# Patient Record
Sex: FEMALE | Race: WHITE | NOT HISPANIC OR LATINO | Employment: UNEMPLOYED | URBAN - NONMETROPOLITAN AREA
[De-identification: names, ages, dates, MRNs, and addresses within clinical notes are randomized per-mention and may not be internally consistent; named-entity substitution may affect disease eponyms.]

---

## 2017-05-18 ENCOUNTER — TELEPHONE (OUTPATIENT)
Dept: CARDIOLOGY | Facility: CLINIC | Age: 57
End: 2017-05-18

## 2017-05-18 ENCOUNTER — OFFICE VISIT (OUTPATIENT)
Dept: CARDIOLOGY | Facility: CLINIC | Age: 57
End: 2017-05-18

## 2017-05-18 VITALS
HEART RATE: 64 BPM | WEIGHT: 165 LBS | SYSTOLIC BLOOD PRESSURE: 90 MMHG | DIASTOLIC BLOOD PRESSURE: 64 MMHG | HEIGHT: 66 IN | BODY MASS INDEX: 26.52 KG/M2

## 2017-05-18 DIAGNOSIS — I10 ESSENTIAL HYPERTENSION: ICD-10-CM

## 2017-05-18 DIAGNOSIS — I25.118 CORONARY ARTERY DISEASE OF NATIVE ARTERY OF NATIVE HEART WITH STABLE ANGINA PECTORIS (HCC): Primary | ICD-10-CM

## 2017-05-18 DIAGNOSIS — E03.8 OTHER SPECIFIED HYPOTHYROIDISM: ICD-10-CM

## 2017-05-18 DIAGNOSIS — E78.49 OTHER HYPERLIPIDEMIA: ICD-10-CM

## 2017-05-18 DIAGNOSIS — K21.9 GASTROESOPHAGEAL REFLUX DISEASE WITHOUT ESOPHAGITIS: ICD-10-CM

## 2017-05-18 PROBLEM — I25.10 CAD (CORONARY ARTERY DISEASE): Status: ACTIVE | Noted: 2017-05-18

## 2017-05-18 PROBLEM — E78.5 HYPERLIPEMIA: Status: ACTIVE | Noted: 2017-05-18

## 2017-05-18 PROBLEM — E03.9 HYPOTHYROIDISM: Status: ACTIVE | Noted: 2017-05-18

## 2017-05-18 PROCEDURE — 99214 OFFICE O/P EST MOD 30 MIN: CPT | Performed by: NURSE PRACTITIONER

## 2017-05-18 RX ORDER — CYCLOBENZAPRINE HCL 10 MG
10 TABLET ORAL 3 TIMES DAILY PRN
COMMUNITY
End: 2018-01-31

## 2017-05-18 RX ORDER — CITALOPRAM 20 MG/1
20 TABLET ORAL DAILY
COMMUNITY
End: 2017-08-22

## 2017-05-18 RX ORDER — TRAZODONE HYDROCHLORIDE 100 MG/1
100 TABLET ORAL NIGHTLY
COMMUNITY
End: 2018-01-31

## 2017-07-25 ENCOUNTER — OUTSIDE FACILITY SERVICE (OUTPATIENT)
Dept: CARDIOLOGY | Facility: CLINIC | Age: 57
End: 2017-07-25

## 2017-07-25 PROCEDURE — 93459 L HRT ART/GRFT ANGIO: CPT | Performed by: INTERNAL MEDICINE

## 2017-07-26 ENCOUNTER — OUTSIDE FACILITY SERVICE (OUTPATIENT)
Dept: CARDIOLOGY | Facility: CLINIC | Age: 57
End: 2017-07-26

## 2017-07-26 PROCEDURE — 99217 PR OBSERVATION CARE DISCHARGE MANAGEMENT: CPT | Performed by: INTERNAL MEDICINE

## 2017-07-31 ENCOUNTER — TELEPHONE (OUTPATIENT)
Dept: CARDIOLOGY | Facility: CLINIC | Age: 57
End: 2017-07-31

## 2017-07-31 NOTE — TELEPHONE ENCOUNTER
Patient received 30 day free card and will continue taking. She was advised to call before running out and we would give her samples.

## 2017-07-31 NOTE — TELEPHONE ENCOUNTER
I am not sure when she was on this.  She was not on it at my last visit.  Maybe added at cath. If we have samples, I would like her to be on brilinta for at least one to two months.

## 2017-07-31 NOTE — TELEPHONE ENCOUNTER
PA for Brilinta was denied. Patient needs to try and fail  one of the following:  Cilostazol, Dipyridamole, Clopidogrel    Which do you suggest? Dose/directions please

## 2017-07-31 NOTE — TELEPHONE ENCOUNTER
Patient received a 30 day free card for the medicaiton. She will continue to take it and will call before running out for samples.

## 2017-08-18 ENCOUNTER — TELEPHONE (OUTPATIENT)
Dept: CARDIOLOGY | Facility: CLINIC | Age: 57
End: 2017-08-18

## 2017-08-18 RX ORDER — METOPROLOL SUCCINATE 25 MG/1
TABLET, EXTENDED RELEASE ORAL
Qty: 90 TABLET | Refills: 0 | Status: SHIPPED | OUTPATIENT
Start: 2017-08-18 | End: 2017-08-22

## 2017-08-18 NOTE — TELEPHONE ENCOUNTER
Patient called requesting refills on Metoprolol Succinate XL 1/2 to one tablet once daily. Refill on Metoprolol Succinate XL 25mg 1/2 to one tablet once daily sent to pharmacy.

## 2017-08-22 ENCOUNTER — OFFICE VISIT (OUTPATIENT)
Dept: CARDIOLOGY | Facility: CLINIC | Age: 57
End: 2017-08-22

## 2017-08-22 VITALS
HEART RATE: 76 BPM | WEIGHT: 159 LBS | SYSTOLIC BLOOD PRESSURE: 94 MMHG | DIASTOLIC BLOOD PRESSURE: 62 MMHG | BODY MASS INDEX: 25.55 KG/M2 | HEIGHT: 66 IN

## 2017-08-22 DIAGNOSIS — E03.9 ACQUIRED HYPOTHYROIDISM: ICD-10-CM

## 2017-08-22 DIAGNOSIS — E78.49 OTHER HYPERLIPIDEMIA: ICD-10-CM

## 2017-08-22 DIAGNOSIS — I10 ESSENTIAL HYPERTENSION: ICD-10-CM

## 2017-08-22 DIAGNOSIS — Z95.1 HX OF CABG: ICD-10-CM

## 2017-08-22 DIAGNOSIS — I25.10 CORONARY ARTERY DISEASE INVOLVING NATIVE CORONARY ARTERY OF NATIVE HEART WITHOUT ANGINA PECTORIS: Primary | ICD-10-CM

## 2017-08-22 DIAGNOSIS — R06.02 SHORTNESS OF BREATH: ICD-10-CM

## 2017-08-22 PROCEDURE — 99214 OFFICE O/P EST MOD 30 MIN: CPT | Performed by: NURSE PRACTITIONER

## 2017-08-22 RX ORDER — LEVOTHYROXINE SODIUM 0.07 MG/1
75 TABLET ORAL DAILY
COMMUNITY
End: 2018-07-31 | Stop reason: DRUGHIGH

## 2017-08-22 RX ORDER — ENALAPRIL MALEATE 5 MG/1
5 TABLET ORAL DAILY
COMMUNITY
End: 2017-08-22

## 2017-08-22 RX ORDER — ENALAPRIL MALEATE 5 MG/1
TABLET ORAL
Qty: 30 TABLET | Refills: 0 | OUTPATIENT
Start: 2017-08-22

## 2017-08-22 RX ORDER — ENALAPRIL MALEATE 2.5 MG/1
2.5 TABLET ORAL DAILY
Qty: 30 TABLET | Refills: 6 | Status: SHIPPED | OUTPATIENT
Start: 2017-08-22 | End: 2017-08-28 | Stop reason: ALTCHOICE

## 2017-08-22 RX ORDER — ASPIRIN 81 MG/1
81 TABLET ORAL DAILY
COMMUNITY
End: 2018-01-08 | Stop reason: SDUPTHER

## 2017-08-22 RX ORDER — CLOPIDOGREL BISULFATE 75 MG/1
75 TABLET ORAL DAILY
Qty: 30 TABLET | Refills: 11 | Status: SHIPPED | OUTPATIENT
Start: 2017-08-22 | End: 2018-07-31 | Stop reason: SDUPTHER

## 2017-08-22 NOTE — PROGRESS NOTES
Chief Complaint   Patient presents with   • Follow-up     Had cath with stenting in July. Denies chest pain. Is concerned about her brilinta being covered on her insurance. Needs refills on enalapril for 30 days to [facility] pharmacy.    • Shortness of Breath     Says she has some shortness of breath after she takes her brilinta.    • Palpitations     Occasional. Says is sometimes r/t the shortness of breath she has with the brilinta.        Subjective       Amina Nath is a 57 y.o. female  with a history of hypertension, hypothyroidism, major depressive disorder, and ischemic heart disease. In 2009 she was noted to have severe three-vessel disease and also possible endocarditis involving the tricuspid valve. She underwent bypass surgery, tricuspid valve repair as well as PFO closure at that time. In 2014 she was seen in the office to establish cardiac care. She later had episodes of syncope and metoprolol was discontinued. In 2016,  she reported chest pain and shortness of breath for which a stress test and echocardiogram was done. No definite ischemia was noted but she did have tachycardia. The dose of her beta blocker was increased. She continued to complain of chest pain and shortness of breath, repeat stress showed no ischemic burden and good LV function.  Echo showed only mild MR. Due to persistent symptoms a cath was done and significant disease of her SVG to RCA and GUEVARA to Ramus was noted and underwent stenting.   Today she comes to the office for a hospital follow up visit and reports improvement of chest pain. She has some shortness of breath when takes Brilinta. Otherwise, she states she is feeling better.     HPI         Cardiac History:    Past Surgical History:   Procedure Laterality Date   • CARDIAC CATHETERIZATION  09/10/2009    Dr. HOLLIDAY 50% LM, 90% LAD, 80% LCX, 70% Ramus, 80% RCA.   • CARDIAC CATHETERIZATION  07/25/2017    100% SVG to RCA. 2.25x8 Promus to RCA. 95% GUEVARA to Ramus- 2.25x12 Promus   •  CARDIOVASCULAR STRESS TEST  06/28/2012    5 min 88% THR. Negative   • CARDIOVASCULAR STRESS TEST  02/03/2014    L. Myoview- negative for ischemia   • CARDIOVASCULAR STRESS TEST  03/24/2016    Mod Elvis-9 min, 84% THR, 142/98, no definite ischemia, increase Metoprolol   • CARDIOVASCULAR STRESS TEST  10/25/2016    EF 65%, no ischemia   • CONVERTED (HISTORICAL) HOLTER  08/11/2015    baseline sinus 66, no arrhythmias, rare PVC, PAC   • CORONARY ARTERY BYPASS GRAFT  09/10/2009    CROOK-LAD, GUEVARA-Ramus, seq SVG-Om1, OM2. SVG to OM3. SVG to PDA. PFO closure. TV repair   • ECHO - CONVERTED  09/10/2009    ([facility]) Dr. Lucero EF 55%. Anteroseptal WMA   • ECHO - CONVERTED  02/03/2014    EF 55-60%, mild MR, no pericardial effusion   • ECHO - CONVERTED  03/24/2016    EF 55-60%, mild MR, RVSP 19 mmHg, tachycardia   • ECHO - CONVERTED  10/25/2016    EF 55-60%, mild MR   • OTHER SURGICAL HISTORY  07/14/2015    MRI brain- no evidence of CVA or hemorrhage   • US CAROTID UNILATERAL  07/22/2015    no sig stenosis       Current Outpatient Prescriptions   Medication Sig Dispense Refill   • aspirin 81 MG EC tablet Take 81 mg by mouth Daily. Increase to twice a day when start Plavix     • atorvastatin (LIPITOR) 20 MG tablet Take 20 mg by mouth Daily.     • Citalopram Hydrobromide (CELEXA PO) Take 30 mg by mouth Daily.     • clonazePAM (KlonoPIN) 0.5 MG tablet Take 0.5 mg by mouth 2 (Two) Times a Day As Needed for seizures.     • cyclobenzaprine (FLEXERIL) 10 MG tablet Take 10 mg by mouth 3 (Three) Times a Day As Needed for Muscle Spasms.     • levothyroxine (SYNTHROID, LEVOTHROID) 75 MCG tablet Take 75 mcg by mouth Daily.     • metoclopramide (REGLAN) 10 MG tablet Take 10 mg by mouth 2 (Two) Times a Day.     • metoprolol tartrate (LOPRESSOR) 25 MG tablet Take 25 mg by mouth 2 (Two) Times a Day.     • nitroglycerin (NITROSTAT) 0.4 MG SL tablet Place 0.4 mg under the tongue Every 5 (Five) Minutes As Needed for chest pain. Take no more than 3  doses in 15 minutes.     • omeprazole (PriLOSEC) 20 MG capsule TAKE ONE CAPSULE BY MOUTH ONCE DAILY 90 capsule 3   • oxybutynin XL (DITROPAN-XL) 10 MG 24 hr tablet Take 10 mg by mouth Daily.     • traZODone (DESYREL) 100 MG tablet Take 100 mg by mouth Every Night.     • clopidogrel (PLAVIX) 75 MG tablet Take 1 tablet by mouth Daily. 30 tablet 11   • enalapril (VASOTEC) 2.5 MG tablet Take 1 tablet by mouth Daily. 30 tablet 6     No current facility-administered medications for this visit.        Review of patient's allergies indicates no known allergies.    Past Medical History:   Diagnosis Date   • Anxiety    • CAD (coronary artery disease)     s/p cabg   • Depression    • GERD (gastroesophageal reflux disease)    • H/O breast biopsy     benign   • Hypercholesterolemia    • Hyperlipidemia    • Hypertension    • Hypothyroidism    • Insomnia        Social History     Social History   • Marital status: Single     Spouse name: N/A   • Number of children: N/A   • Years of education: N/A     Occupational History   • Not on file.     Social History Main Topics   • Smoking status: Former Smoker     Quit date: 2009   • Smokeless tobacco: Never Used      Comment: 1-2 ppd for 35 years   • Alcohol use No   • Drug use: No   • Sexual activity: Not on file     Other Topics Concern   • Not on file     Social History Narrative       Family History   Problem Relation Age of Onset   • Heart attack Father        Review of Systems   Constitution: Positive for malaise/fatigue and weight loss (watching diet better. ). Negative for decreased appetite.   HENT: Negative for congestion, nosebleeds and sore throat.    Eyes: Negative for blurred vision.   Cardiovascular: Negative for chest pain, dyspnea on exertion, near-syncope and palpitations.   Respiratory: Positive for shortness of breath (mild attributes to Brilinta). Negative for snoring.    Endocrine: Negative for cold intolerance and heat intolerance.   Hematologic/Lymphatic: Negative  "for adenopathy and bleeding problem. Bruises/bleeds easily.   Skin: Negative for itching, nail changes and skin cancer.   Musculoskeletal: Negative for arthritis and myalgias.   Gastrointestinal: Negative for abdominal pain, dysphagia, heartburn and melena.   Genitourinary: Negative for dysuria and hematuria.   Neurological: Negative for dizziness, light-headedness, seizures and vertigo.   Psychiatric/Behavioral: Negative for altered mental status. The patient is nervous/anxious. The patient does not have insomnia.    Allergic/Immunologic: Negative for environmental allergies and hives.        Diabetes- No  Thyroid-abnormal    Objective     BP 94/62  Pulse 76  Ht 65.5\" (166.4 cm)  Wt 159 lb (72.1 kg)  BMI 26.06 kg/m2    Physical Exam   Constitutional: She is oriented to person, place, and time. She appears well-nourished.   HENT:   Head: Normocephalic.   Eyes: Pupils are equal, round, and reactive to light.   Neck: Normal range of motion. Neck supple. No JVD present. Carotid bruit is not present. No thyromegaly present.   Cardiovascular: Normal rate, regular rhythm, S1 normal and S2 normal.    No murmur heard.  Pulses:       Radial pulses are 3+ on the right side, and 3+ on the left side.   Pulmonary/Chest: Breath sounds normal.   Abdominal: Soft. Bowel sounds are normal. There is no tenderness.   Musculoskeletal: Normal range of motion. She exhibits no edema.   Neurological: She is alert and oriented to person, place, and time.   Skin: Skin is warm and dry. No rash noted. No pallor.   Psychiatric: She has a normal mood and affect. Her behavior is normal.      Procedures          Assessment/Plan      Amina was seen today for follow-up, shortness of breath and palpitations.    Diagnoses and all orders for this visit:    Coronary artery disease involving native coronary artery of native heart without angina pectoris    Essential hypertension    Other hyperlipidemia    Acquired hypothyroidism    Shortness of " "breath    Hx of CABG    Other orders  -     clopidogrel (PLAVIX) 75 MG tablet; Take 1 tablet by mouth Daily.  -     enalapril (VASOTEC) 2.5 MG tablet; Take 1 tablet by mouth Daily.      The report of her recent cardiac cath with stenting of vein graft and GUEVARA was reviewed. Her symptoms have improved. Blood pressure is slight low which she states makes her feel \" a little tired\". We will decrease the dose of Vasotec. Her Insurance does not cover Brilinta therefore samples given to take for next couple weeks. Then she will change to Plavix and increase aspirin to 81 mg bid. No other medication changes made at this time. Her weight is down about 6 pounds and BMI 26. I encouraged her on maintaining healthier diet and goal weight 155. For management of labs she will follow with you.            Electronically signed by PRACHI Brink,  August 22, 2017 3:32 PM  "

## 2017-08-24 ENCOUNTER — TELEPHONE (OUTPATIENT)
Dept: CARDIOLOGY | Facility: CLINIC | Age: 57
End: 2017-08-24

## 2017-08-24 NOTE — TELEPHONE ENCOUNTER
Shayy CLAYTON      Patient called in reporting that when she saw you the other day forgot to tell you that she has not been taking enalapril and that she was only taking Metoprolol 25mg 1/2 tablet daily due to low b/p.

## 2017-09-18 ENCOUNTER — TELEPHONE (OUTPATIENT)
Dept: CARDIOLOGY | Facility: CLINIC | Age: 57
End: 2017-09-18

## 2017-09-18 NOTE — TELEPHONE ENCOUNTER
Received call from patient wanting to know if okay to take Amoxicillin 875mg with Brilinta. Patient instructed that it is okay to take Amoxicillin with Brilinta.

## 2017-11-17 ENCOUNTER — TELEPHONE (OUTPATIENT)
Dept: CARDIOLOGY | Facility: CLINIC | Age: 57
End: 2017-11-17

## 2017-11-17 RX ORDER — METOPROLOL SUCCINATE 25 MG/1
TABLET, EXTENDED RELEASE ORAL
Qty: 90 TABLET | Refills: 0 | OUTPATIENT
Start: 2017-11-17

## 2017-11-17 RX ORDER — METOPROLOL SUCCINATE 25 MG/1
TABLET, EXTENDED RELEASE ORAL
Qty: 15 TABLET | Refills: 3 | Status: SHIPPED | OUTPATIENT
Start: 2017-11-17 | End: 2018-01-31 | Stop reason: SDUPTHER

## 2017-11-17 NOTE — TELEPHONE ENCOUNTER
Patient made aware of recommendations for Toprol xl 25mg 1/2 tablet daily, patient verbalized understanding. Script for Toprol xl 25mg 1/2 tablet daily sent to pharmacy.

## 2017-11-17 NOTE — TELEPHONE ENCOUNTER
Patient called stating has been taking Toprol xl 25 mg 1/2 tablet daily instead of to Lopressor 25mg 1/2 tablet because Lopressor is to small for her to half, states doing well with Toprol xl 25mg 1/2 tablet daily and requesting Toprol xl 25mg 1/2 tablet daily. What is your recommendations? Thanks

## 2018-01-08 RX ORDER — ASPIRIN 81 MG/1
TABLET ORAL
Qty: 60 TABLET | Refills: 8 | Status: SHIPPED | OUTPATIENT
Start: 2018-01-08 | End: 2018-07-31 | Stop reason: SDUPTHER

## 2018-01-31 ENCOUNTER — OFFICE VISIT (OUTPATIENT)
Dept: CARDIOLOGY | Facility: CLINIC | Age: 58
End: 2018-01-31

## 2018-01-31 VITALS
HEART RATE: 88 BPM | HEIGHT: 66 IN | DIASTOLIC BLOOD PRESSURE: 72 MMHG | SYSTOLIC BLOOD PRESSURE: 100 MMHG | OXYGEN SATURATION: 93 % | BODY MASS INDEX: 27.24 KG/M2 | WEIGHT: 169.5 LBS

## 2018-01-31 DIAGNOSIS — I10 ESSENTIAL HYPERTENSION: ICD-10-CM

## 2018-01-31 DIAGNOSIS — Z98.890 S/P TVR (TRICUSPID VALVE REPAIR): ICD-10-CM

## 2018-01-31 DIAGNOSIS — Z95.1 HX OF CABG: ICD-10-CM

## 2018-01-31 DIAGNOSIS — R55 SYNCOPE AND COLLAPSE: ICD-10-CM

## 2018-01-31 DIAGNOSIS — E78.49 OTHER HYPERLIPIDEMIA: ICD-10-CM

## 2018-01-31 DIAGNOSIS — I34.0 NON-RHEUMATIC MITRAL REGURGITATION: ICD-10-CM

## 2018-01-31 DIAGNOSIS — I25.118 CORONARY ARTERY DISEASE OF NATIVE ARTERY OF NATIVE HEART WITH STABLE ANGINA PECTORIS (HCC): Primary | ICD-10-CM

## 2018-01-31 DIAGNOSIS — R00.2 PALPITATIONS: ICD-10-CM

## 2018-01-31 PROCEDURE — 99214 OFFICE O/P EST MOD 30 MIN: CPT | Performed by: NURSE PRACTITIONER

## 2018-01-31 RX ORDER — NITROGLYCERIN 0.4 MG/1
0.4 TABLET SUBLINGUAL
Qty: 25 TABLET | Refills: 1 | Status: SHIPPED | OUTPATIENT
Start: 2018-01-31 | End: 2019-02-04 | Stop reason: SDUPTHER

## 2018-01-31 NOTE — PROGRESS NOTES
"Chief Complaint   Patient presents with   • Follow-up     Cardiac management. Denies chest pain, shortness of breath or palpitaitions. Said about a week and a half ago she felt dizzy and had to use the bathroom. Upon getting up from commode she got dizzy, passed out and hit head on floor. Says she has not had anymore episode since.   • Chest Pain     Reports at times has a pain in the left side of chest down outer part of breast, doesnt last long.   • Med Refill     Needs refillls on Nitroglycerin tabs sent to [facility] in [place]       Subjective       Amina Nath is a 57 y.o. female  with a history of hypertension, hypothyroidism, major depressive disorder, and ischemic heart disease. In 2009 she was noted to have severe three-vessel disease and also possible endocarditis involving the tricuspid valve. She underwent bypass surgery, tricuspid valve repair as well as PFO closure at that time. In 2014 she was seen in the office to establish cardiac care. She later had episodes of syncope and metoprolol was discontinued. In 2016,  she reported chest pain and shortness of breath for which a stress test and echocardiogram were done. No definite ischemia was noted but she did have tachycardia. The dose of her beta blocker was increased. She continued to complain of chest pain and shortness of breath, repeat stress showed no ischemic burden and good LV function.  Echo showed only mild MR. Due to persistent symptoms a cath was done and significant disease of her SVG to RCA and GUEVARA to Ramus was noted and underwent stenting.   Today she returns to the office for a follow up visit. She voices concern over recent episode of dizziness. She felt dizziness and then went to restroom. When standing up she \"passed out\". Since that time she has had some mild episodes of brief dizziness but no more syncope. Over the last couple of months she has noticed development of mild left sided chest pain. These symptoms concerns her given " they are very similar to symptoms she experienced prior to last cath when she underwent stenting of SVG and GUEVARA.     HPI: as noted above         Cardiac History:    Past Surgical History:   Procedure Laterality Date   • CARDIAC CATHETERIZATION  09/10/2009    Dr. HOLLIDAY 50% LM, 90% LAD, 80% LCX, 70% Ramus, 80% RCA.   • CARDIAC CATHETERIZATION  07/25/2017    100% SVG to RCA. 2.25x8 Promus to RCA. 95% GUEVARA to Ramus- 2.25x12 Promus   • CARDIOVASCULAR STRESS TEST  06/28/2012    5 min 88% THR. Negative   • CARDIOVASCULAR STRESS TEST  02/03/2014    L. Myoview- negative for ischemia   • CARDIOVASCULAR STRESS TEST  03/24/2016    Mod Elvis-9 min, 84% THR, 142/98, no definite ischemia, increase Metoprolol   • CARDIOVASCULAR STRESS TEST  10/25/2016    EF 65%, no ischemia   • CONVERTED (HISTORICAL) HOLTER  08/11/2015    baseline sinus 66, no arrhythmias, rare PVC, PAC   • CORONARY ARTERY BYPASS GRAFT  09/10/2009    CROOK-LAD, GUEVARA-Ramus, seq SVG-Om1, OM2. SVG to OM3. SVG to PDA. PFO closure. TV repair   • ECHO - CONVERTED  09/10/2009    ([facility]) Dr. Lucero EF 55%. Anteroseptal WMA   • ECHO - CONVERTED  02/03/2014    EF 55-60%, mild MR, no pericardial effusion   • ECHO - CONVERTED  03/24/2016    EF 55-60%, mild MR, RVSP 19 mmHg, tachycardia   • ECHO - CONVERTED  10/25/2016    EF 55-60%, mild MR   • OTHER SURGICAL HISTORY  07/14/2015    MRI brain- no evidence of CVA or hemorrhage   • US CAROTID UNILATERAL  07/22/2015    no sig stenosis       Current Outpatient Prescriptions   Medication Sig Dispense Refill   • aspirin 81 MG EC tablet 1 tab twice a day 60 tablet 8   • clopidogrel (PLAVIX) 75 MG tablet Take 1 tablet by mouth Daily. 30 tablet 11   • levothyroxine (SYNTHROID, LEVOTHROID) 75 MCG tablet Take 75 mcg by mouth Daily.     • metoclopramide (REGLAN) 10 MG tablet Take 10 mg by mouth 2 (Two) Times a Day.     • metoprolol tartrate (LOPRESSOR) 25 MG tablet Take 1/2 tablet by mouth daily 15 tablet 5   • nitroglycerin (NITROSTAT) 0.4 MG SL  tablet Place 1 tablet under the tongue Every 5 (Five) Minutes As Needed for Chest Pain. Take no more than 3 doses in 15 minutes. 25 tablet 1   • omeprazole (PriLOSEC) 20 MG capsule TAKE ONE CAPSULE BY MOUTH ONCE DAILY 90 capsule 3   • oxybutynin XL (DITROPAN-XL) 10 MG 24 hr tablet Take 10 mg by mouth Daily.       No current facility-administered medications for this visit.        Review of patient's allergies indicates no known allergies.    Past Medical History:   Diagnosis Date   • Anxiety    • CAD (coronary artery disease)     s/p cabg   • Depression    • GERD (gastroesophageal reflux disease)    • H/O breast biopsy     benign   • Hypercholesterolemia    • Hyperlipidemia    • Hypertension    • Hypothyroidism    • Insomnia        Social History     Social History   • Marital status: Single     Spouse name: N/A   • Number of children: N/A   • Years of education: N/A     Occupational History   • Not on file.     Social History Main Topics   • Smoking status: Former Smoker     Quit date: 2009   • Smokeless tobacco: Never Used      Comment: 1-2 ppd for 35 years   • Alcohol use No   • Drug use: No   • Sexual activity: Not on file     Other Topics Concern   • Not on file     Social History Narrative       Family History   Problem Relation Age of Onset   • Heart attack Father        Review of Systems   Constitution: Positive for malaise/fatigue. Negative for decreased appetite and diaphoresis.   HENT: Positive for nosebleeds. Negative for congestion.    Cardiovascular: Positive for chest pain, palpitations (few brief episodes of heart pounding) and syncope. Negative for leg swelling.   Respiratory: Positive for shortness of breath. Negative for cough and sleep disturbances due to breathing.    Hematologic/Lymphatic: Negative for bleeding problem. Bruises/bleeds easily.   Skin: Negative for color change and itching.   Musculoskeletal: Positive for falls. Negative for muscle cramps and myalgias.   Gastrointestinal:  "Negative for change in bowel habit, heartburn, melena and nausea.   Genitourinary: Negative for dysuria and hematuria.   Neurological: Positive for dizziness. Negative for headaches, numbness and tremors.   Psychiatric/Behavioral: Positive for depression. Negative for memory loss. The patient is nervous/anxious. The patient does not have insomnia.    Allergic/Immunologic: Negative for hives.        Diabetes- No  Thyroid-normal    Objective     /72 (BP Location: Left arm, Patient Position: Sitting, Cuff Size: Adult)  Pulse 88  Ht 166.4 cm (65.5\")  Wt 76.9 kg (169 lb 8 oz)  SpO2 93%  BMI 27.78 kg/m2    Physical Exam   Constitutional: She is oriented to person, place, and time. She appears well-nourished.   HENT:   Head: Normocephalic.   Eyes: Conjunctivae are normal. Pupils are equal, round, and reactive to light.   Neck: Normal range of motion. No JVD present. Carotid bruit is not present.   Cardiovascular: Normal rate, regular rhythm, S1 normal, S2 normal and normal pulses.    Murmur heard.   Systolic murmur is present with a grade of 2/6   Pulmonary/Chest: Effort normal and breath sounds normal. She has no rales.   Abdominal: Soft. Bowel sounds are normal. She exhibits no distension. There is no tenderness.   Musculoskeletal: Normal range of motion. She exhibits no edema.   Neurological: She is alert and oriented to person, place, and time.   Skin: Skin is warm. No pallor.   Psychiatric: She has a normal mood and affect. Her behavior is normal. Judgment and thought content normal.      Procedures: none today          Assessment/Plan      Amina was seen today for follow-up, chest pain and med refill.    Diagnoses and all orders for this visit:    Coronary artery disease of native artery of native heart with stable angina pectoris  -     Stress Test With Myocardial Perfusion One Day; Future  -     Adult Transthoracic Echo Complete W/ Cont if Necessary Per Protocol; Future    Syncope and collapse  -     " Stress Test With Myocardial Perfusion One Day; Future  -     Adult Transthoracic Echo Complete W/ Cont if Necessary Per Protocol; Future    Palpitations  -     Stress Test With Myocardial Perfusion One Day; Future  -     Adult Transthoracic Echo Complete W/ Cont if Necessary Per Protocol; Future    Essential hypertension  -     Stress Test With Myocardial Perfusion One Day; Future  -     Adult Transthoracic Echo Complete W/ Cont if Necessary Per Protocol; Future    Other hyperlipidemia  -     Stress Test With Myocardial Perfusion One Day; Future    Hx of CABG  -     Stress Test With Myocardial Perfusion One Day; Future  -     Adult Transthoracic Echo Complete W/ Cont if Necessary Per Protocol; Future    S/P TVR (tricuspid valve repair)  -     Stress Test With Myocardial Perfusion One Day; Future  -     Adult Transthoracic Echo Complete W/ Cont if Necessary Per Protocol; Future    Non-rheumatic mitral regurgitation  -     Adult Transthoracic Echo Complete W/ Cont if Necessary Per Protocol; Future    Other orders  -     nitroglycerin (NITROSTAT) 0.4 MG SL tablet; Place 1 tablet under the tongue Every 5 (Five) Minutes As Needed for Chest Pain. Take no more than 3 doses in 15 minutes.    Ms. Nath reports symptoms suggestive of ischemia. She has also had a syncopal episode. To reassess coronary blood flow a nuclear stress test scheduled. To reassess PFO closure, cardiac murmur and overall LV function an echocardiogram ordered. No cardiac medication changes made today. Refills given for nitrostat. She follows with you for management of labs. I encouraged her on maintaining adequate hydration and avoiding caffeine. Further recommendations based on test results.                Electronically signed by PRACHI Brink,  January 31, 2018 4:46 PM

## 2018-02-12 ENCOUNTER — HOSPITAL ENCOUNTER (OUTPATIENT)
Dept: CARDIOLOGY | Facility: HOSPITAL | Age: 58
Discharge: HOME | End: 2018-02-12

## 2018-02-12 ENCOUNTER — OUTSIDE FACILITY SERVICE (OUTPATIENT)
Dept: CARDIOLOGY | Facility: CLINIC | Age: 58
End: 2018-02-12

## 2018-02-12 DIAGNOSIS — I10 ESSENTIAL HYPERTENSION: ICD-10-CM

## 2018-02-12 DIAGNOSIS — R55 SYNCOPE AND COLLAPSE: ICD-10-CM

## 2018-02-12 DIAGNOSIS — Z98.890 S/P TVR (TRICUSPID VALVE REPAIR): ICD-10-CM

## 2018-02-12 DIAGNOSIS — Z95.1 HX OF CABG: ICD-10-CM

## 2018-02-12 DIAGNOSIS — R00.2 PALPITATIONS: ICD-10-CM

## 2018-02-12 DIAGNOSIS — I25.118 CORONARY ARTERY DISEASE OF NATIVE ARTERY OF NATIVE HEART WITH STABLE ANGINA PECTORIS (HCC): ICD-10-CM

## 2018-02-12 DIAGNOSIS — E78.49 OTHER HYPERLIPIDEMIA: ICD-10-CM

## 2018-02-12 DIAGNOSIS — I34.0 NON-RHEUMATIC MITRAL REGURGITATION: ICD-10-CM

## 2018-02-12 LAB
MAXIMAL PREDICTED HEART RATE: 164 BPM
MAXIMAL PREDICTED HEART RATE: 164 BPM
STRESS TARGET HR: 139 BPM
STRESS TARGET HR: 139 BPM

## 2018-02-12 PROCEDURE — 78452 HT MUSCLE IMAGE SPECT MULT: CPT

## 2018-02-12 PROCEDURE — 93017 CV STRESS TEST TRACING ONLY: CPT

## 2018-02-12 PROCEDURE — 78452 HT MUSCLE IMAGE SPECT MULT: CPT | Performed by: INTERNAL MEDICINE

## 2018-02-12 PROCEDURE — 0 TECHNETIUM SESTAMIBI: Performed by: INTERNAL MEDICINE

## 2018-02-12 PROCEDURE — 93306 TTE W/DOPPLER COMPLETE: CPT

## 2018-02-12 PROCEDURE — 93018 CV STRESS TEST I&R ONLY: CPT | Performed by: INTERNAL MEDICINE

## 2018-02-12 PROCEDURE — A9500 TC99M SESTAMIBI: HCPCS | Performed by: INTERNAL MEDICINE

## 2018-02-12 PROCEDURE — 25010000002 REGADENOSON 0.4 MG/5ML SOLUTION: Performed by: INTERNAL MEDICINE

## 2018-02-12 PROCEDURE — 93306 TTE W/DOPPLER COMPLETE: CPT | Performed by: INTERNAL MEDICINE

## 2018-02-12 RX ORDER — SODIUM CHLORIDE 9 MG/ML
8 INJECTION INTRAMUSCULAR; INTRAVENOUS; SUBCUTANEOUS AS NEEDED
Status: DISCONTINUED | OUTPATIENT
Start: 2018-02-12 | End: 2018-02-13 | Stop reason: HOSPADM

## 2018-02-12 RX ADMIN — REGADENOSON 0.4 MG: 0.08 INJECTION, SOLUTION INTRAVENOUS at 08:45

## 2018-02-12 RX ADMIN — TECHNETIUM TC 99M SESTAMIBI 1 DOSE: 1 INJECTION INTRAVENOUS at 08:45

## 2018-02-12 RX ADMIN — SODIUM CHLORIDE 8 ML: 9 INJECTION, SOLUTION INTRAMUSCULAR; INTRAVENOUS; SUBCUTANEOUS at 08:40

## 2018-02-14 ENCOUNTER — TELEPHONE (OUTPATIENT)
Dept: CARDIOLOGY | Facility: CLINIC | Age: 58
End: 2018-02-14

## 2018-02-14 RX ORDER — ISOSORBIDE MONONITRATE 30 MG/1
30 TABLET, EXTENDED RELEASE ORAL DAILY
Qty: 30 TABLET | Refills: 7 | Status: SHIPPED | OUTPATIENT
Start: 2018-02-14 | End: 2018-07-31 | Stop reason: SDDI

## 2018-02-14 NOTE — TELEPHONE ENCOUNTER
Patient aware of stress test results and recommendations.  Add Imdur 30 mg daily.  Patient aware to call if symptoms persist.

## 2018-02-27 ENCOUNTER — TELEPHONE (OUTPATIENT)
Dept: CARDIOLOGY | Facility: CLINIC | Age: 58
End: 2018-02-27

## 2018-02-27 RX ORDER — METOPROLOL SUCCINATE 25 MG/1
TABLET, EXTENDED RELEASE ORAL
Qty: 15 TABLET | Refills: 5 | Status: SHIPPED | OUTPATIENT
Start: 2018-02-27 | End: 2018-07-31 | Stop reason: SDUPTHER

## 2018-07-31 ENCOUNTER — OFFICE VISIT (OUTPATIENT)
Dept: CARDIOLOGY | Facility: CLINIC | Age: 58
End: 2018-07-31

## 2018-07-31 VITALS
HEART RATE: 64 BPM | DIASTOLIC BLOOD PRESSURE: 60 MMHG | WEIGHT: 174 LBS | HEIGHT: 66 IN | SYSTOLIC BLOOD PRESSURE: 92 MMHG | BODY MASS INDEX: 27.97 KG/M2

## 2018-07-31 DIAGNOSIS — E03.9 ACQUIRED HYPOTHYROIDISM: ICD-10-CM

## 2018-07-31 DIAGNOSIS — E78.49 OTHER HYPERLIPIDEMIA: ICD-10-CM

## 2018-07-31 DIAGNOSIS — I10 ESSENTIAL HYPERTENSION: ICD-10-CM

## 2018-07-31 DIAGNOSIS — Z95.1 HX OF CABG: ICD-10-CM

## 2018-07-31 DIAGNOSIS — I25.118 CORONARY ARTERY DISEASE OF NATIVE ARTERY OF NATIVE HEART WITH STABLE ANGINA PECTORIS (HCC): Primary | ICD-10-CM

## 2018-07-31 PROCEDURE — 99214 OFFICE O/P EST MOD 30 MIN: CPT | Performed by: NURSE PRACTITIONER

## 2018-07-31 RX ORDER — ATORVASTATIN CALCIUM 20 MG/1
20 TABLET, FILM COATED ORAL DAILY
COMMUNITY
End: 2018-07-31 | Stop reason: SDUPTHER

## 2018-07-31 RX ORDER — LEVOTHYROXINE SODIUM 88 UG/1
88 TABLET ORAL DAILY
COMMUNITY
End: 2019-08-22 | Stop reason: ALTCHOICE

## 2018-07-31 RX ORDER — RANOLAZINE 500 MG/1
500 TABLET, EXTENDED RELEASE ORAL EVERY 12 HOURS SCHEDULED
Qty: 14 TABLET | Refills: 0 | COMMUNITY
Start: 2018-07-31 | End: 2019-02-04

## 2018-07-31 RX ORDER — METOPROLOL SUCCINATE 25 MG/1
TABLET, EXTENDED RELEASE ORAL
Qty: 15 TABLET | Refills: 8 | Status: SHIPPED | OUTPATIENT
Start: 2018-07-31 | End: 2019-02-04 | Stop reason: SDUPTHER

## 2018-07-31 RX ORDER — ASPIRIN 81 MG/1
TABLET ORAL
Qty: 60 TABLET | Refills: 8 | Status: SHIPPED | OUTPATIENT
Start: 2018-07-31 | End: 2019-04-15 | Stop reason: SDUPTHER

## 2018-07-31 RX ORDER — PANTOPRAZOLE SODIUM 20 MG/1
20 TABLET, DELAYED RELEASE ORAL DAILY
COMMUNITY
End: 2021-08-30 | Stop reason: SDUPTHER

## 2018-07-31 RX ORDER — ATORVASTATIN CALCIUM 20 MG/1
20 TABLET, FILM COATED ORAL DAILY
Start: 2018-07-31 | End: 2019-02-04 | Stop reason: SDUPTHER

## 2018-07-31 RX ORDER — CLOPIDOGREL BISULFATE 75 MG/1
75 TABLET ORAL DAILY
Qty: 30 TABLET | Refills: 8 | Status: SHIPPED | OUTPATIENT
Start: 2018-07-31 | End: 2019-02-04 | Stop reason: SDUPTHER

## 2019-02-04 ENCOUNTER — OFFICE VISIT (OUTPATIENT)
Dept: CARDIOLOGY | Facility: CLINIC | Age: 59
End: 2019-02-04

## 2019-02-04 VITALS
HEART RATE: 84 BPM | HEIGHT: 66 IN | DIASTOLIC BLOOD PRESSURE: 62 MMHG | BODY MASS INDEX: 26.84 KG/M2 | SYSTOLIC BLOOD PRESSURE: 102 MMHG | WEIGHT: 167 LBS

## 2019-02-04 DIAGNOSIS — E78.49 OTHER HYPERLIPIDEMIA: ICD-10-CM

## 2019-02-04 DIAGNOSIS — E03.9 ACQUIRED HYPOTHYROIDISM: ICD-10-CM

## 2019-02-04 DIAGNOSIS — I10 ESSENTIAL HYPERTENSION: ICD-10-CM

## 2019-02-04 DIAGNOSIS — I25.9 IHD (ISCHEMIC HEART DISEASE): Primary | ICD-10-CM

## 2019-02-04 DIAGNOSIS — Z79.899 MEDICATION MANAGEMENT: ICD-10-CM

## 2019-02-04 DIAGNOSIS — Z95.1 HX OF CABG: ICD-10-CM

## 2019-02-04 PROCEDURE — 99213 OFFICE O/P EST LOW 20 MIN: CPT | Performed by: NURSE PRACTITIONER

## 2019-02-04 RX ORDER — NITROGLYCERIN 0.4 MG/1
0.4 TABLET SUBLINGUAL
Qty: 25 TABLET | Refills: 1 | Status: SHIPPED | OUTPATIENT
Start: 2019-02-04 | End: 2020-01-13

## 2019-02-04 RX ORDER — CLOPIDOGREL BISULFATE 75 MG/1
75 TABLET ORAL DAILY
Qty: 30 TABLET | Refills: 8 | Status: SHIPPED | OUTPATIENT
Start: 2019-02-04 | End: 2020-01-06

## 2019-02-04 RX ORDER — ATORVASTATIN CALCIUM 20 MG/1
20 TABLET, FILM COATED ORAL DAILY
Qty: 30 TABLET | Refills: 8
Start: 2019-02-04 | End: 2020-02-25 | Stop reason: SDUPTHER

## 2019-02-04 RX ORDER — METOPROLOL SUCCINATE 25 MG/1
TABLET, EXTENDED RELEASE ORAL
Qty: 15 TABLET | Refills: 8 | Status: SHIPPED | OUTPATIENT
Start: 2019-02-04 | End: 2020-01-06

## 2019-02-04 NOTE — PROGRESS NOTES
Chief Complaint   Patient presents with   • Follow-up     cardiac management.  Ranexa samples given at last visit to try.  She did not call after taking.  She states she did not see much improvement.  Recently, reports it has been over a month since any angina.   • Med Refill     pt brought med list.  pt needs refills on cardiac meds to [facility], [place], 30 days.   • Labs     pt states PCP only checked lipids about 3 weeks ago.  She is planning to change PCP soon.       Subjective       Amina Nath is a 58 y.o. female  with a history of hypertension, hypothyroidism, major depressive disorder, and ischemic heart disease. In 2009 she was noted to have severe three-vessel disease and also possible endocarditis involving the tricuspid valve. She underwent bypass surgery, tricuspid valve repair as well as PFO closure at that time. In 2014 she established care here. She had episodes of syncope and metoprolol was temporarily held. Stress test in 2016 for CP did not show any ischemia. She continued to have discomfort, so cardiac cath was done revealing significant disease of SVG to RCA and GUEVARA to ramus and had stents placed in each graft in July 2017. Stress test on 2/12/18 showed small inferoseptal ischemia with Imdur added and plan for cath for continued symptoms. At her July 2018 office visit she had not started Imdur. Ranexa was added as better option due to lower bp.    Today       HPI     Cardiac History:    Past Surgical History:   Procedure Laterality Date   • CARDIAC CATHETERIZATION  09/10/2009    Dr. HOLLIDAY 50% LM, 90% LAD, 80% LCX, 70% Ramus, 80% RCA.   • CARDIAC CATHETERIZATION  07/25/2017    100% SVG to RCA. 2.25x8 Promus to RCA. 95% GUEVARA to Ramus- 2.25x12 Promus   • CARDIOVASCULAR STRESS TEST  06/28/2012    5 min 88% THR. Negative   • CARDIOVASCULAR STRESS TEST  02/03/2014    L. Myoview- negative for ischemia   • CARDIOVASCULAR STRESS TEST  03/24/2016    Mod Elvis-9 min, 84% THR, 142/98, no definite ischemia,  increase Metoprolol   • CARDIOVASCULAR STRESS TEST  10/25/2016    EF 65%, no ischemia   • CARDIOVASCULAR STRESS TEST  02/12/2018    L. Cardiolite- EF 65%. Small Inferoseptal Ischemia.   • CONVERTED (HISTORICAL) HOLTER  08/11/2015    baseline sinus 66, no arrhythmias, rare PVC, PAC   • CORONARY ARTERY BYPASS GRAFT  09/10/2009    CROOK-LAD, GUEVARA-Ramus, seq SVG-Om1, OM2. SVG to OM3. SVG to PDA. PFO closure. TV repair   • ECHO - CONVERTED  09/10/2009    ([facility]) Dr. Lucero EF 55%. Anteroseptal WMA   • ECHO - CONVERTED  02/03/2014    EF 55-60%, mild MR, no pericardial effusion   • ECHO - CONVERTED  03/24/2016    EF 55-60%, mild MR, RVSP 19 mmHg, tachycardia   • ECHO - CONVERTED  10/25/2016    EF 55-60%, mild MR   • ECHO - CONVERTED  02/12/2018    EF    • ECHO - CONVERTED  02/12/2018    EF 65%. No shunt   • OTHER SURGICAL HISTORY  07/14/2015    MRI brain- no evidence of CVA or hemorrhage   • US CAROTID UNILATERAL  07/22/2015    no sig stenosis       Current Outpatient Medications   Medication Sig Dispense Refill   • aspirin 81 MG EC tablet 1 tab twice a day (Patient taking differently: Daily.) 60 tablet 8   • atorvastatin (LIPITOR) 20 MG tablet Take 1 tablet by mouth Daily. 30 tablet 8   • clopidogrel (PLAVIX) 75 MG tablet Take 1 tablet by mouth Daily. 30 tablet 8   • levothyroxine (SYNTHROID, LEVOTHROID) 88 MCG tablet Take 88 mcg by mouth Daily.     • metoclopramide (REGLAN) 10 MG tablet Take 10 mg by mouth 2 (Two) Times a Day.     • metoprolol succinate XL (TOPROL-XL) 25 MG 24 hr tablet Takes 1/2 tab daily 15 tablet 8   • nitroglycerin (NITROSTAT) 0.4 MG SL tablet Place 1 tablet under the tongue Every 5 (Five) Minutes As Needed for Chest Pain. Take no more than 3 doses in 15 minutes. 25 tablet 1   • oxybutynin XL (DITROPAN-XL) 10 MG 24 hr tablet Take 10 mg by mouth Daily.     • pantoprazole (PROTONIX) 20 MG EC tablet Take 20 mg by mouth Daily.       No current facility-administered medications for this visit.        Patient  has no known allergies.    Past Medical History:   Diagnosis Date   • Anxiety    • CAD (coronary artery disease)     s/p cabg   • Depression    • GERD (gastroesophageal reflux disease)    • H/O breast biopsy     benign   • Hypercholesterolemia    • Hyperlipidemia    • Hypertension    • Hypothyroidism    • Insomnia        Social History     Socioeconomic History   • Marital status: Single     Spouse name: Not on file   • Number of children: Not on file   • Years of education: Not on file   • Highest education level: Not on file   Social Needs   • Financial resource strain: Not on file   • Food insecurity - worry: Not on file   • Food insecurity - inability: Not on file   • Transportation needs - medical: Not on file   • Transportation needs - non-medical: Not on file   Occupational History   • Not on file   Tobacco Use   • Smoking status: Former Smoker     Last attempt to quit: 2009     Years since quitting: 10.0   • Smokeless tobacco: Never Used   • Tobacco comment: 1-2 ppd for 35 years   Substance and Sexual Activity   • Alcohol use: No   • Drug use: No   • Sexual activity: Not on file   Other Topics Concern   • Not on file   Social History Narrative   • Not on file       Family History   Problem Relation Age of Onset   • Heart attack Father        Review of Systems   Constitution: Negative for decreased appetite and weight loss.   HENT: Negative for congestion and nosebleeds.    Eyes: Negative for redness and visual disturbance.   Cardiovascular: Negative for chest pain, leg swelling, near-syncope and palpitations.   Respiratory: Negative for cough, shortness of breath and sleep disturbances due to breathing.    Endocrine: Negative for polydipsia, polyphagia and polyuria.   Hematologic/Lymphatic: Negative for bleeding problem. Bruises/bleeds easily.   Skin: Negative for dry skin and itching.   Musculoskeletal: Negative for falls, muscle cramps and muscle weakness.   Gastrointestinal: Negative for abdominal pain,  "dysphagia, heartburn (not recent), melena and nausea.   Genitourinary: Negative for dysuria and hematuria.   Neurological: Negative for dizziness, headaches and light-headedness.   Psychiatric/Behavioral: Positive for depression. The patient has insomnia and is nervous/anxious.         Objective     /62 (BP Location: Right arm)   Pulse 84   Ht 166.4 cm (65.5\")   Wt 75.8 kg (167 lb)   BMI 27.37 kg/m²     Physical Exam   Constitutional: She is oriented to person, place, and time. She appears well-nourished.   HENT:   Head: Normocephalic.   Eyes: Conjunctivae are normal. Pupils are equal, round, and reactive to light.   Neck: Normal range of motion. Neck supple. Carotid bruit is not present.   Cardiovascular: Normal rate, regular rhythm, S1 normal and S2 normal.   No murmur heard.  Pulses:       Radial pulses are 2+ on the right side, and 2+ on the left side.   Pulmonary/Chest: Breath sounds normal. She has no wheezes. She has no rales.   Abdominal: Soft. Bowel sounds are normal. She exhibits no distension. There is no tenderness.   Musculoskeletal: Normal range of motion. She exhibits no edema.   Neurological: She is alert and oriented to person, place, and time.   Skin: Skin is warm and dry. No pallor.   Psychiatric: She has a normal mood and affect.        Procedures:none today        Assessment/Plan      Amina was seen today for follow-up, med refill and labs.    Diagnoses and all orders for this visit:    IHD (ischemic heart disease)    Hx of CABG    Essential hypertension  -     CBC & Differential; Future  -     Comprehensive Metabolic Panel; Future    Other hyperlipidemia  -     Comprehensive Metabolic Panel; Future    Acquired hypothyroidism  -     TSH; Future    Medication management  -     CBC & Differential; Future  -     Comprehensive Metabolic Panel; Future  -     TSH; Future    Other orders  -     nitroglycerin (NITROSTAT) 0.4 MG SL tablet; Place 1 tablet under the tongue Every 5 (Five) Minutes " As Needed for Chest Pain. Take no more than 3 doses in 15 minutes.  -     atorvastatin (LIPITOR) 20 MG tablet; Take 1 tablet by mouth Daily.  -     clopidogrel (PLAVIX) 75 MG tablet; Take 1 tablet by mouth Daily.  -     metoprolol succinate XL (TOPROL-XL) 25 MG 24 hr tablet; Takes 1/2 tab daily      Ms. Nath denies recent angina or symptoms of ischemia. Her blood pressure, heart rate and rhythm are normal today. No cardiac testing ordered at this time. I did update her nitrostat. Same cardiac medications advised and refills given.     A lab order given as noted above and I have requested a copy be sent to you.     She asked about having dental procedure done. I advised her to contact the office for cardiac clearance if needed. At this time, she should be able to hold Plavix 3-5 days for procedure given it has been over a year post stenting.     Patient's Body mass index is 27.37 kg/m². BMI is above normal parameters. Recommendations include: nutrition counseling.    Continue Lipitor for cholesterol management. She reports recent lipid panel has been done. Please forward a copy of results to the office.      A 6 month follow up visit scheduled. Please call sooner for any cardiac concerns.            Electronically signed by PRACHI Brink,  February 4, 2019 2:56 PM

## 2019-04-16 RX ORDER — ASPIRIN 81 MG/1
TABLET ORAL
Qty: 60 TABLET | Refills: 8 | Status: SHIPPED | OUTPATIENT
Start: 2019-04-16 | End: 2020-01-06

## 2019-08-08 ENCOUNTER — TELEPHONE (OUTPATIENT)
Dept: CARDIOLOGY | Facility: CLINIC | Age: 59
End: 2019-08-08

## 2019-08-08 NOTE — TELEPHONE ENCOUNTER
Received fax from Dr. Tanmay Ellison for cardiac clearance for patient to have a colonoscopy. Patient is on aspirin and Plavix. According to our records, patient's last stent was done on 07/25/17.      Fax [phone]

## 2019-08-22 ENCOUNTER — OFFICE VISIT (OUTPATIENT)
Dept: CARDIOLOGY | Facility: CLINIC | Age: 59
End: 2019-08-22

## 2019-08-22 VITALS
DIASTOLIC BLOOD PRESSURE: 80 MMHG | HEIGHT: 66 IN | HEART RATE: 72 BPM | SYSTOLIC BLOOD PRESSURE: 116 MMHG | BODY MASS INDEX: 26.68 KG/M2 | WEIGHT: 166 LBS

## 2019-08-22 DIAGNOSIS — Z95.1 HX OF CABG: ICD-10-CM

## 2019-08-22 DIAGNOSIS — I10 ESSENTIAL HYPERTENSION: ICD-10-CM

## 2019-08-22 DIAGNOSIS — I25.118 CORONARY ARTERY DISEASE OF NATIVE ARTERY OF NATIVE HEART WITH STABLE ANGINA PECTORIS (HCC): Primary | ICD-10-CM

## 2019-08-22 DIAGNOSIS — I20.8 CHRONIC STABLE ANGINA (HCC): ICD-10-CM

## 2019-08-22 DIAGNOSIS — E03.9 ACQUIRED HYPOTHYROIDISM: ICD-10-CM

## 2019-08-22 DIAGNOSIS — E66.3 OVERWEIGHT: ICD-10-CM

## 2019-08-22 DIAGNOSIS — E78.2 MIXED HYPERLIPIDEMIA: ICD-10-CM

## 2019-08-22 PROCEDURE — 99213 OFFICE O/P EST LOW 20 MIN: CPT | Performed by: NURSE PRACTITIONER

## 2019-08-22 RX ORDER — RANOLAZINE 500 MG/1
500 TABLET, EXTENDED RELEASE ORAL 2 TIMES DAILY
Qty: 60 TABLET | Refills: 8 | Status: SHIPPED | OUTPATIENT
Start: 2019-08-22 | End: 2020-02-25 | Stop reason: ALTCHOICE

## 2019-08-22 RX ORDER — LEVOTHYROXINE SODIUM 0.07 MG/1
75 TABLET ORAL DAILY
COMMUNITY

## 2019-08-22 NOTE — PROGRESS NOTES
Chief Complaint   Patient presents with   • Follow-up     For cardiac management. Patient is on aspirin. Reports that she did have some chest pain a couple of months ago, did not take nitro. Has had some shortness of breath. Last lab work was done about 3 months ago per PCP, not in chart, states that she will have more done next week.   • Med Refill     No refills needed       Cardiac Complaints  Dyspnea, chest pain      Subjective   Amina Nath is a 59 y.o. female with hypertension, hypothyroidism, major depressive disorder, and ischemic heart disease. In 2009 she was noted to have severe three-vessel disease and also possible endocarditis involving the tricuspid valve. She underwent bypass surgery, tricuspid valve repair as well as PFO closure at that time. In 2014 she established care here. She had episodes of syncope and metoprolol was temporarily held. Stress test in 2016 for CP did not show any ischemia. She continued to have discomfort, so cardiac cath was done revealing significant disease of SVG to RCA and GUEVARA to ramus and had stents placed in each graft in July 2017. Stress test on 2/12/18 showed small inferoseptal ischemia with Imdur added and plan for cath for continued symptoms. At her July 2018 office visit she had not started Imdur. Ranexa was added as better option due to lower bp.    Patient returns today for follow up and denies any new concerns.  She does admit to chest pain that occurred a few months ago with exertion but denies any use of SL NTG.  Patient denies pain has returned since that time.  For reasons unknown, she is not taking ranexa therapy but does not know why or if she ever started. Shortness of breath persists but she denies any worse than prior. Dizziness and palpitations denied.  Labs she reports with PCP and states most recent were done a few months ago and will be done again next week. No refills currently needed.         Cardiac History  Past Surgical History:   Procedure  Laterality Date   • CARDIAC CATHETERIZATION  09/10/2009    Dr. HOLLIDAY 50% LM, 90% LAD, 80% LCX, 70% Ramus, 80% RCA.   • CARDIAC CATHETERIZATION  07/25/2017    100% SVG to RCA. 2.25x8 Promus to RCA. 95% GUEVARA to Ramus- 2.25x12 Promus   • CARDIOVASCULAR STRESS TEST  06/28/2012    5 min 88% THR. Negative   • CARDIOVASCULAR STRESS TEST  02/03/2014    L. Myoview- negative for ischemia   • CARDIOVASCULAR STRESS TEST  03/24/2016    Mod Elvis-9 min, 84% THR, 142/98, no definite ischemia, increase Metoprolol   • CARDIOVASCULAR STRESS TEST  10/25/2016    EF 65%, no ischemia   • CARDIOVASCULAR STRESS TEST  02/12/2018    L. Cardiolite- EF 65%. Small Inferoseptal Ischemia.   • CONVERTED (HISTORICAL) HOLTER  08/11/2015    baseline sinus 66, no arrhythmias, rare PVC, PAC   • CORONARY ARTERY BYPASS GRAFT  09/10/2009    CROOK-LAD, GUEVARA-Ramus, seq SVG-Om1, OM2. SVG to OM3. SVG to PDA. PFO closure. TV repair   • ECHO - CONVERTED  09/10/2009    ([facility]) Dr. Lucero EF 55%. Anteroseptal WMA   • ECHO - CONVERTED  02/03/2014    EF 55-60%, mild MR, no pericardial effusion   • ECHO - CONVERTED  03/24/2016    EF 55-60%, mild MR, RVSP 19 mmHg, tachycardia   • ECHO - CONVERTED  10/25/2016    EF 55-60%, mild MR   • ECHO - CONVERTED  02/12/2018    EF    • ECHO - CONVERTED  02/12/2018    EF 65%. No shunt   • OTHER SURGICAL HISTORY  07/14/2015    MRI brain- no evidence of CVA or hemorrhage   • US CAROTID UNILATERAL  07/22/2015    no sig stenosis       Current Outpatient Medications   Medication Sig Dispense Refill   • aspirin (MADYSON LOW DOSE) 81 MG EC tablet TAKE 1 TABLET BY MOUTH TWICE DAILY 60 tablet 8   • atorvastatin (LIPITOR) 20 MG tablet Take 1 tablet by mouth Daily. 30 tablet 8   • clopidogrel (PLAVIX) 75 MG tablet Take 1 tablet by mouth Daily. 30 tablet 8   • levothyroxine (SYNTHROID, LEVOTHROID) 75 MCG tablet Take 75 mcg by mouth Daily.     • metoclopramide (REGLAN) 10 MG tablet Take 10 mg by mouth 2 (Two) Times a Day.     • metoprolol succinate XL  (TOPROL-XL) 25 MG 24 hr tablet Takes 1/2 tab daily 15 tablet 8   • nitroglycerin (NITROSTAT) 0.4 MG SL tablet Place 1 tablet under the tongue Every 5 (Five) Minutes As Needed for Chest Pain. Take no more than 3 doses in 15 minutes. 25 tablet 1   • oxybutynin XL (DITROPAN-XL) 10 MG 24 hr tablet Take 10 mg by mouth Daily.     • pantoprazole (PROTONIX) 20 MG EC tablet Take 20 mg by mouth Daily.     • ranolazine (RANEXA) 500 MG 12 hr tablet Take 1 tablet by mouth 2 (Two) Times a Day. 60 tablet 8     No current facility-administered medications for this visit.        Patient has no known allergies.    Past Medical History:   Diagnosis Date   • Anxiety    • CAD (coronary artery disease)     s/p cabg   • Depression    • GERD (gastroesophageal reflux disease)    • H/O breast biopsy     benign   • Hypercholesterolemia    • Hyperlipidemia    • Hypertension    • Hypothyroidism    • Insomnia        Social History     Socioeconomic History   • Marital status: Single     Spouse name: Not on file   • Number of children: Not on file   • Years of education: Not on file   • Highest education level: Not on file   Tobacco Use   • Smoking status: Former Smoker     Last attempt to quit: 2009     Years since quitting: 10.6   • Smokeless tobacco: Never Used   • Tobacco comment: 1-2 ppd for 35 years   Substance and Sexual Activity   • Alcohol use: No   • Drug use: No       Family History   Problem Relation Age of Onset   • Heart attack Father        Review of Systems   Constitution: Negative for weakness and malaise/fatigue.   Cardiovascular: Positive for chest pain. Negative for claudication, dyspnea on exertion, irregular heartbeat, near-syncope, orthopnea, palpitations and syncope.   Respiratory: Positive for shortness of breath. Negative for cough and wheezing.    Musculoskeletal: Negative for back pain, joint pain and joint swelling.   Gastrointestinal: Negative for anorexia, dysphagia, nausea and vomiting.   Genitourinary: Negative  "for dysuria, hematuria, hesitancy and nocturia.   Neurological: Negative for dizziness, light-headedness and loss of balance.   Psychiatric/Behavioral: Negative for depression and memory loss. The patient is not nervous/anxious.            Objective     /80 (BP Location: Right arm)   Pulse 72   Ht 166.4 cm (65.51\")   Wt 75.3 kg (166 lb)   BMI 27.19 kg/m²     Physical Exam   Constitutional: She is oriented to person, place, and time. She appears well-developed and well-nourished.   HENT:   Head: Normocephalic and atraumatic.   Eyes: EOM are normal. Pupils are equal, round, and reactive to light.   Neck: Normal range of motion.   Cardiovascular: Normal rate and regular rhythm.   Murmur heard.  Pulmonary/Chest: Effort normal and breath sounds normal.   Abdominal: Soft.   Musculoskeletal: Normal range of motion.   Neurological: She is alert and oriented to person, place, and time.   Skin: Skin is warm and dry.   Psychiatric: She has a normal mood and affect. Her behavior is normal.       Procedures    Assessment/Plan     HR is stable with regular rhythm noted.  HTN well managed on current. Patient will be started on ranexa therapy for chronic angina/abnormal stress as she was supposed to be taking since 2018 but is unsure why she is not.  Script sent in regards. If chest pain should not improve with addition, she was urged to call for further recommendations.  She will continue on DAPT therapy for history of CAD with CABG as bleeding and bruising denied. No recent labs are available, but she states you are monitoring her FLP for hyperlipidemia and statin dosing as well as TSH for hypothyroidism and synthroid dosing.  Could we have next for review?  No refills currently needed. BMI elevated at 27.19.  Good cardiac diet with limited fried, fatty, fast food, and sodium intake advised. 6 month follow up recommended or sooner if needed.           Problems Addressed this Visit        Cardiovascular and Mediastinum "    CAD (coronary artery disease) - Primary    Relevant Medications    ranolazine (RANEXA) 500 MG 12 hr tablet    HTN (hypertension)    Hyperlipemia       Endocrine    Hypothyroidism    Relevant Medications    levothyroxine (SYNTHROID, LEVOTHROID) 75 MCG tablet       Other    Hx of CABG      Other Visit Diagnoses     Chronic stable angina (CMS/HCC)        Relevant Medications    ranolazine (RANEXA) 500 MG 12 hr tablet    Overweight              Patient's Body mass index is 27.19 kg/m². BMI is above normal parameters. Recommendations include: nutrition counseling.                Electronically signed by PRACHI Hernandez August 23, 2019 8:48 AM

## 2019-08-23 ENCOUNTER — PRIOR AUTHORIZATION (OUTPATIENT)
Dept: CARDIOLOGY | Facility: CLINIC | Age: 59
End: 2019-08-23

## 2020-01-06 RX ORDER — ASPIRIN 81 MG/1
TABLET ORAL
Qty: 60 TABLET | Refills: 11 | Status: SHIPPED | OUTPATIENT
Start: 2020-01-06 | End: 2020-02-25 | Stop reason: SDUPTHER

## 2020-01-06 RX ORDER — CLOPIDOGREL BISULFATE 75 MG/1
TABLET ORAL
Qty: 30 TABLET | Refills: 3 | Status: SHIPPED | OUTPATIENT
Start: 2020-01-06 | End: 2020-02-25 | Stop reason: SDUPTHER

## 2020-01-06 RX ORDER — METOPROLOL SUCCINATE 25 MG/1
TABLET, EXTENDED RELEASE ORAL
Qty: 15 TABLET | Refills: 3 | Status: SHIPPED | OUTPATIENT
Start: 2020-01-06 | End: 2020-02-25 | Stop reason: SDUPTHER

## 2020-01-13 RX ORDER — NITROGLYCERIN 0.4 MG/1
TABLET SUBLINGUAL
Qty: 25 TABLET | Refills: 0 | Status: SHIPPED | OUTPATIENT
Start: 2020-01-13 | End: 2020-06-19

## 2020-02-25 ENCOUNTER — OFFICE VISIT (OUTPATIENT)
Dept: CARDIOLOGY | Facility: CLINIC | Age: 60
End: 2020-02-25

## 2020-02-25 VITALS
HEART RATE: 76 BPM | HEIGHT: 66 IN | WEIGHT: 169 LBS | SYSTOLIC BLOOD PRESSURE: 110 MMHG | DIASTOLIC BLOOD PRESSURE: 60 MMHG | BODY MASS INDEX: 27.16 KG/M2

## 2020-02-25 DIAGNOSIS — I10 ESSENTIAL HYPERTENSION: ICD-10-CM

## 2020-02-25 DIAGNOSIS — Z95.1 HX OF CABG: ICD-10-CM

## 2020-02-25 DIAGNOSIS — I25.10 CORONARY ARTERY DISEASE INVOLVING NATIVE CORONARY ARTERY OF NATIVE HEART WITHOUT ANGINA PECTORIS: Primary | ICD-10-CM

## 2020-02-25 DIAGNOSIS — E78.2 MIXED HYPERLIPIDEMIA: ICD-10-CM

## 2020-02-25 PROCEDURE — 99213 OFFICE O/P EST LOW 20 MIN: CPT | Performed by: NURSE PRACTITIONER

## 2020-02-25 RX ORDER — ATORVASTATIN CALCIUM 20 MG/1
20 TABLET, FILM COATED ORAL DAILY
Qty: 30 TABLET | Refills: 8
Start: 2020-02-25 | End: 2021-02-25 | Stop reason: SDUPTHER

## 2020-02-25 RX ORDER — CLOPIDOGREL BISULFATE 75 MG/1
75 TABLET ORAL DAILY
Qty: 30 TABLET | Refills: 8 | Status: SHIPPED | OUTPATIENT
Start: 2020-02-25 | End: 2021-02-01

## 2020-02-25 RX ORDER — ASPIRIN 81 MG/1
TABLET ORAL
Qty: 60 TABLET | Refills: 11 | Status: SHIPPED | OUTPATIENT
Start: 2020-02-25 | End: 2021-03-22 | Stop reason: SDUPTHER

## 2020-02-25 RX ORDER — METOPROLOL SUCCINATE 25 MG/1
TABLET, EXTENDED RELEASE ORAL
Qty: 15 TABLET | Refills: 8 | Status: SHIPPED | OUTPATIENT
Start: 2020-02-25 | End: 2021-02-01

## 2020-02-25 NOTE — PROGRESS NOTES
Chief Complaint   Patient presents with   • Follow-up     Cardiac management.   • Lab     She is unable to recall last labs.   • Shortness of Breath     Couple months ago she had 2 episodes of shortness of breath, she feels could have been related to not getting enought sleep.   • Med Refill     Needs refills on Metoprolol, Plavix and Nitro sl-30 day.       Subjective       Amina Nath is a 60 y.o. female with hypertension, hypothyroidism, major depressive disorder, and ischemic heart disease. In 2009 she was noted to have severe three-vessel disease and also possible endocarditis involving the tricuspid valve. She underwent bypass surgery, tricuspid valve repair as well as PFO closure at that time. In 2014 she established care here. She had episodes of syncope and metoprolol was temporarily held. Stress test in 2016 for CP did not show any ischemia. She continued to have discomfort, so cardiac cath was done revealing significant disease of SVG to RCA and GUEVARA to ramus and had stents placed in each graft in July 2017. Stress test on 2/12/18 showed small inferoseptal ischemia with Imdur added and plan for cath for continued symptoms. At her July 2018 office visit she had not started Imdur. Ranexa was added as better option due to lower bp. She came today for follow up. She has not started Ranexa. She denies having any chest discomfort or significant shortness of breath. She walks daily to the library and exercises every Wednesday there. She is unsure when last labs were checked. Plans to see Dr. Helena dominguez.     HPI         Cardiac History:    Past Surgical History:   Procedure Laterality Date   • CARDIAC CATHETERIZATION  09/10/2009    Dr. HOLLIDAY 50% LM, 90% LAD, 80% LCX, 70% Ramus, 80% RCA.   • CARDIAC CATHETERIZATION  07/25/2017    100% SVG to RCA. 2.25x8 Promus to RCA. 95% GUEVARA to Ramus- 2.25x12 Promus   • CARDIOVASCULAR STRESS TEST  06/28/2012    5 min 88% THR. Negative   • CARDIOVASCULAR STRESS TEST  02/03/2014     L. Myoview- negative for ischemia   • CARDIOVASCULAR STRESS TEST  03/24/2016    Mod Levis-9 min, 84% THR, 142/98, no definite ischemia, increase Metoprolol   • CARDIOVASCULAR STRESS TEST  10/25/2016    EF 65%, no ischemia   • CARDIOVASCULAR STRESS TEST  02/12/2018    L. Cardiolite- EF 65%. Small Inferoseptal Ischemia.   • CONVERTED (HISTORICAL) HOLTER  08/11/2015    baseline sinus 66, no arrhythmias, rare PVC, PAC   • CORONARY ARTERY BYPASS GRAFT  09/10/2009    CROOK-LAD, GUEVARA-Ramus, seq SVG-Om1, OM2. SVG to OM3. SVG to PDA. PFO closure. TV repair   • ECHO - CONVERTED  09/10/2009    ([facility]) Dr. Lucero EF 55%. Anteroseptal WMA   • ECHO - CONVERTED  02/03/2014    EF 55-60%, mild MR, no pericardial effusion   • ECHO - CONVERTED  03/24/2016    EF 55-60%, mild MR, RVSP 19 mmHg, tachycardia   • ECHO - CONVERTED  10/25/2016    EF 55-60%, mild MR   • ECHO - CONVERTED  02/12/2018    EF 65%. No shunt   • OTHER SURGICAL HISTORY  07/14/2015    MRI brain- no evidence of CVA or hemorrhage   • US CAROTID UNILATERAL  07/22/2015    no sig stenosis       Current Outpatient Medications   Medication Sig Dispense Refill   • aspirin (MADYSON LOW DOSE) 81 MG EC tablet TAKE 1 TABLET BY MOUTH once DAILY 60 tablet 11   • atorvastatin (LIPITOR) 20 MG tablet Take 1 tablet by mouth Daily. 30 tablet 8   • clopidogrel (PLAVIX) 75 MG tablet Take 1 tablet by mouth Daily. 30 tablet 8   • levothyroxine (SYNTHROID, LEVOTHROID) 75 MCG tablet Take 75 mcg by mouth Daily.     • metoclopramide (REGLAN) 10 MG tablet Take 10 mg by mouth 2 (Two) Times a Day.     • metoprolol succinate XL (TOPROL-XL) 25 MG 24 hr tablet TAKE 1/2 (ONE-HALF) TABLET BY MOUTH ONCE DAILY 15 tablet 8   • nitroglycerin (NITROSTAT) 0.4 MG SL tablet DISSOLVE ONE TABLET UNDER THE TONGUE EVERY 5 MINUTES AS NEEDED FOR CHEST PAIN.  DO NOT EXCEED A TOTAL OF 3 DOSES IN 15 MINUTES 25 tablet 0   • oxybutynin XL (DITROPAN-XL) 10 MG 24 hr tablet Take 10 mg by mouth Daily.     • pantoprazole (PROTONIX)  "20 MG EC tablet Take 20 mg by mouth Daily.       No current facility-administered medications for this visit.        Patient has no known allergies.    Past Medical History:   Diagnosis Date   • Anxiety    • CAD (coronary artery disease)     s/p cabg   • Depression    • GERD (gastroesophageal reflux disease)    • H/O breast biopsy     benign   • Hypercholesterolemia    • Hyperlipidemia    • Hypertension    • Hypothyroidism    • Insomnia        Social History     Socioeconomic History   • Marital status: Single     Spouse name: Not on file   • Number of children: Not on file   • Years of education: Not on file   • Highest education level: Not on file   Tobacco Use   • Smoking status: Former Smoker     Last attempt to quit:      Years since quittin.1   • Smokeless tobacco: Never Used   • Tobacco comment: 1-2 ppd for 35 years   Substance and Sexual Activity   • Alcohol use: No   • Drug use: No       Family History   Problem Relation Age of Onset   • Heart attack Father        Review of Systems   Constitution: Positive for malaise/fatigue (mild) and weight gain (up 3 lb ). Negative for decreased appetite.   HENT: Negative.    Eyes: Negative.    Cardiovascular: Negative for chest pain, dyspnea on exertion, leg swelling, orthopnea, palpitations and syncope.   Respiratory: Negative.    Endocrine: Negative.    Hematologic/Lymphatic: Negative.    Skin: Negative.    Musculoskeletal: Negative for falls and myalgias.   Gastrointestinal: Negative for abdominal pain and melena.   Genitourinary: Negative for dysuria and hematuria.   Neurological: Negative for dizziness.   Psychiatric/Behavioral: Negative for altered mental status and depression.   Allergic/Immunologic: Negative.         Diabetes- No  Thyroid-normal    Objective     /60 (BP Location: Right arm)   Pulse 76   Ht 166.4 cm (65.51\")   Wt 76.7 kg (169 lb)   BMI 27.69 kg/m²     Physical Exam   Constitutional: She is oriented to person, place, and time. " She appears well-developed and well-nourished. No distress.   HENT:   Head: Normocephalic and atraumatic.   Eyes: Pupils are equal, round, and reactive to light.   Neck: Normal range of motion. Neck supple.   Cardiovascular: Normal rate, regular rhythm and intact distal pulses.  No extrasystoles are present.   Murmur heard.   Systolic murmur is present with a grade of 1/6 at the apex.  Pulmonary/Chest: Effort normal and breath sounds normal. No respiratory distress. She has no wheezes.   Abdominal: Soft. Bowel sounds are normal.   Musculoskeletal: Normal range of motion. She exhibits no edema.   Neurological: She is alert and oriented to person, place, and time.   Skin: Skin is warm and dry. She is not diaphoretic.   Psychiatric: She has a normal mood and affect.   Nursing note and vitals reviewed.     Procedures          Assessment/Plan      Amina was seen today for follow-up, lab, shortness of breath and med refill.    Diagnoses and all orders for this visit:    Coronary artery disease involving native coronary artery of native heart without angina pectoris  -     metoprolol succinate XL (TOPROL-XL) 25 MG 24 hr tablet; TAKE 1/2 (ONE-HALF) TABLET BY MOUTH ONCE DAILY  -     clopidogrel (PLAVIX) 75 MG tablet; Take 1 tablet by mouth Daily.  -     aspirin (MADYSON LOW DOSE) 81 MG EC tablet; TAKE 1 TABLET BY MOUTH once DAILY    Essential hypertension  -     metoprolol succinate XL (TOPROL-XL) 25 MG 24 hr tablet; TAKE 1/2 (ONE-HALF) TABLET BY MOUTH ONCE DAILY    Mixed hyperlipidemia  -     atorvastatin (LIPITOR) 20 MG tablet; Take 1 tablet by mouth Daily.    Hx of CABG  -     clopidogrel (PLAVIX) 75 MG tablet; Take 1 tablet by mouth Daily.  -     aspirin (MADYSON LOW DOSE) 81 MG EC tablet; TAKE 1 TABLET BY MOUTH once DAILY    1. CAD- stable, asymptomatic; s/p multivessel CABG 9/2009; 100% SVG to RCA, 95% GUEVARA to ramus, s/p stent to RCA and ramus, 7/2017. Repeat nuclear stress 2/2018 showed small inferoseptal ischemia, EF  65%. Continue medical management with cath if symptoms persist. She has no anginal symptoms. She is taking neither Imdur nor Ranexa and remains asymptomatic. Continue Plavix, aspirin, statin, BB. SL nitro PRN.    2.  HTN- well controlled, continue current medications. Limit sodium. Heart healthy diet. Limit sugar/starches for BMI closer to 25.     3. Hyperlipidemia- managed with atorvastatin. She prefers to have labs with Dr. Malik so [facility] can make one trip for labs and visit. Would you mind forwarding copy when available.     Refills sent for cardiac meds. She remains asymptomatic. Encouraged to continue regular exercise. Follow up in six months or sooner if needed. She is advised to discuss long-term use of Reglan with Dr. Ellison at next visit. She denies EPS.     Patient's Body mass index is 27.69 kg/m². BMI is above normal parameters. Recommendations include: nutrition counseling.               Electronically signed by PRACHI Valdivia,  February 25, 2020 1:53 PM

## 2020-02-25 NOTE — PATIENT INSTRUCTIONS
Mediterranean Diet  A Mediterranean diet refers to food and lifestyle choices that are based on the traditions of countries located on the [place]. This way of eating has been shown to help prevent certain conditions and improve outcomes for people who have chronic diseases, like kidney disease and heart disease.  What are tips for following this plan?  Lifestyle  · Cook and eat meals together with your family, when possible.  · Drink enough fluid to keep your urine clear or pale yellow.  · Be physically active every day. This includes:  ? Aerobic exercise like running or swimming.  ? Leisure activities like gardening, walking, or housework.  · Get 7-8 hours of sleep each night.  · If recommended by your health care provider, drink red wine in moderation. This means 1 glass a day for nonpregnant women and 2 glasses a day for men. A glass of wine equals 5 oz (150 mL).  Reading food labels    · Check the serving size of packaged foods. For foods such as rice and pasta, the serving size refers to the amount of cooked product, not dry.  · Check the total fat in packaged foods. Avoid foods that have saturated fat or trans fats.  · Check the ingredients list for added sugars, such as corn syrup.  Shopping  · At the grocery store, buy most of your food from the areas near the walls of the store. This includes:  ? Fresh fruits and vegetables (produce).  ? Grains, beans, nuts, and seeds. Some of these may be available in unpackaged forms or large amounts (in bulk).  ? Fresh seafood.  ? Poultry and eggs.  ? Low-fat dairy products.  · Buy whole ingredients instead of prepackaged foods.  · Buy fresh fruits and vegetables in-season from local farmers markets.  · Buy frozen fruits and vegetables in resealable bags.  · If you do not have access to quality fresh seafood, buy precooked frozen shrimp or canned fish, such as tuna, salmon, or sardines.  · Buy small amounts of raw or cooked vegetables, salads, or olives from  the deli or salad bar at your store.  · Stock your pantry so you always have certain foods on hand, such as olive oil, canned tuna, canned tomatoes, rice, pasta, and beans.  Cooking  · Cook foods with extra-virgin olive oil instead of using butter or other vegetable oils.  · Have meat as a side dish, and have vegetables or grains as your main dish. This means having meat in small portions or adding small amounts of meat to foods like pasta or stew.  · Use beans or vegetables instead of meat in common dishes like chili or lasagna.  · [place] with different cooking methods. Try roasting or broiling vegetables instead of steaming or sautéeing them.  · Add frozen vegetables to soups, stews, pasta, or rice.  · Add nuts or seeds for added healthy fat at each meal. You can add these to yogurt, salads, or vegetable dishes.  · Marinate fish or vegetables using olive oil, lemon juice, garlic, and fresh herbs.  Meal planning    · Plan to eat 1 vegetarian meal one day each week. Try to work up to 2 vegetarian meals, if possible.  · Eat seafood 2 or more times a week.  · Have healthy snacks readily available, such as:  ? Vegetable sticks with hummus.  ? Greek yogurt.  ? Fruit and nut trail mix.  · Eat balanced meals throughout the week. This includes:  ? Fruit: 2-3 servings a day  ? Vegetables: 4-5 servings a day  ? Low-fat dairy: 2 servings a day  ? Fish, poultry, or lean meat: 1 serving a day  ? Beans and legumes: 2 or more servings a week  ? Nuts and seeds: 1-2 servings a day  ? Whole grains: 6-8 servings a day  ? Extra-virgin olive oil: 3-4 servings a day  · Limit red meat and sweets to only a few servings a month  What are my food choices?  · Mediterranean diet  ? Recommended  ? Grains: Whole-grain pasta. Brown rice. Bulgar wheat. Polenta. Couscous. Whole-wheat bread. Oatmeal. Quinoa.  ? Vegetables: Artichokes. Beets. Broccoli. Cabbage. Carrots. Eggplant. Green beans. Chard. Kale. Spinach. Onions. Leeks. Peas. Squash.  Tomatoes. Peppers. Radishes.  ? Fruits: Apples. Apricots. Avocado. Berries. Bananas. Cherries. Dates. Figs. Grapes. Angel. Melon. Oranges. Peaches. Plums. Pomegranate.  ? Meats and other protein foods: Beans. Almonds. Sunflower seeds. Pine nuts. Peanuts. Cod. [place]. Scallops. Shrimp. Tuna. Tilapia. Clams. Oysters. Eggs.  ? Dairy: Low-fat milk. Cheese. Greek yogurt.  ? Beverages: Water. Red wine. Herbal tea.  ? Fats and oils: Extra virgin olive oil. Avocado oil. Grape seed oil.  ? Sweets and desserts: Greek yogurt with honey. Baked apples. Poached pears. Trail mix.  ? Seasoning and other foods: Basil. Cilantro. Coriander. Cumin. Mint. Parsley. Shar. Rosemary. Tarragon. Garlic. Oregano. Thyme. Pepper. Balsalmic vinegar. Tahini. Hummus. Tomato sauce. Olives. Mushrooms.  ? Limit these  ? Grains: Prepackaged pasta or rice dishes. Prepackaged cereal with added sugar.  ? Vegetables: Deep fried potatoes (french fries).  ? Fruits: Fruit canned in syrup.  ? Meats and other protein foods: Beef. Pork. Lamb. Poultry with skin. Hot dogs. [place].  ? Dairy: Ice cream. Sour cream. Whole milk.  ? Beverages: Juice. Sugar-sweetened soft drinks. Beer. Liquor and spirits.  ? Fats and oils: Butter. Canola oil. Vegetable oil. Beef fat (tallow). Lard.  ? Sweets and desserts: Cookies. Cakes. Pies. Candy.  ? Seasoning and other foods: Mayonnaise. Premade sauces and marinades.  ? The items listed may not be a complete list. Talk with your dietitian about what dietary choices are right for you.  Summary  · The Mediterranean diet includes both food and lifestyle choices.  · Eat a variety of fresh fruits and vegetables, beans, nuts, seeds, and whole grains.  · Limit the amount of red meat and sweets that you eat.  · Talk with your health care provider about whether it is safe for you to drink red wine in moderation. This means 1 glass a day for nonpregnant women and 2 glasses a day for men. A glass of wine equals 5 oz (150 mL).  This information  is not intended to replace advice given to you by your health care provider. Make sure you discuss any questions you have with your health care provider.  Document Released: 08/10/2017 Document Revised: 09/12/2017 Document Reviewed: 08/10/2017  [email or web address] Interactive Patient Education © 2020 [employer]

## 2020-05-14 ENCOUNTER — TELEPHONE (OUTPATIENT)
Dept: CARDIOLOGY | Facility: CLINIC | Age: 60
End: 2020-05-14

## 2020-05-14 NOTE — TELEPHONE ENCOUNTER
Dr. Tanmay Ellisno requesting cardiac clearance for colonoscopy on 6/12/2020 and how many days Plavix can be held?    02/12/2018-L. Cardiolite- EF 65%. Small Inferoseptal Ischemia.    07/25/20171369-Nggo-149% SVG to RCA. 2.25x8 Promus to RCA. 95% GUEVARA to Ramus- 2.25x12 Promus

## 2020-06-19 RX ORDER — NITROGLYCERIN 0.4 MG/1
TABLET SUBLINGUAL
Qty: 25 TABLET | Refills: 0 | Status: SHIPPED | OUTPATIENT
Start: 2020-06-19 | End: 2021-02-25 | Stop reason: SDUPTHER

## 2020-08-25 ENCOUNTER — OFFICE VISIT (OUTPATIENT)
Dept: CARDIOLOGY | Facility: CLINIC | Age: 60
End: 2020-08-25

## 2020-08-25 VITALS
DIASTOLIC BLOOD PRESSURE: 70 MMHG | HEIGHT: 66 IN | WEIGHT: 168.4 LBS | HEART RATE: 72 BPM | BODY MASS INDEX: 27.06 KG/M2 | TEMPERATURE: 97.8 F | SYSTOLIC BLOOD PRESSURE: 140 MMHG

## 2020-08-25 DIAGNOSIS — Z98.890 HISTORY OF TRICUSPID VALVE REPAIR: ICD-10-CM

## 2020-08-25 DIAGNOSIS — E78.2 MIXED HYPERLIPIDEMIA: ICD-10-CM

## 2020-08-25 DIAGNOSIS — R07.89 CHEST WALL PAIN: ICD-10-CM

## 2020-08-25 DIAGNOSIS — Z95.1 HX OF CABG: ICD-10-CM

## 2020-08-25 DIAGNOSIS — I10 ESSENTIAL HYPERTENSION: ICD-10-CM

## 2020-08-25 DIAGNOSIS — I25.10 CORONARY ARTERY DISEASE INVOLVING NATIVE CORONARY ARTERY OF NATIVE HEART WITHOUT ANGINA PECTORIS: ICD-10-CM

## 2020-08-25 PROCEDURE — 99214 OFFICE O/P EST MOD 30 MIN: CPT | Performed by: NURSE PRACTITIONER

## 2020-08-25 NOTE — PROGRESS NOTES
Chief Complaint   Patient presents with   • Follow-up     Cardiac management . No current labs  .Has c/o pain at chest when she lays flat and moves arm, she describes it more as musckoskeletal.   • Med Refill     No refills needed today. Had medication list today.       Subjective       Amina Nath is a 60 y.o. female with hypertension, hypothyroidism, major depressive disorder, and ischemic heart disease. In 2009 she was noted to have severe three-vessel disease and also possible endocarditis involving the tricuspid valve. She underwent bypass surgery, tricuspid valve repair as well as PFO closure at that time. In 2014 she established care here. She had episodes of syncope and metoprolol was temporarily held. Stress test in 2016 for CP did not show any ischemia. She continued to have discomfort, so cardiac cath was done revealing significant disease of SVG to RCA and GUEVARA to ramus and had stents placed in each graft in July 2017. Stress test on 2/12/18 showed small inferoseptal ischemia with Imdur added and plan for cath for continued symptoms. At her July 2018 office visit she had not started Imdur. Ranexa was added as better option due to lower bp.     Today she comes the office for follow-up visit.  She denies anginal chest pain, palpitations, shortness of breath, or dizziness.  She does admit to soreness and tenderness on the right side of her breastbone.  No recent pulmonary symptoms noted.  She is maintaining her normal daily activity without issue.  No change in cardiac medications noted.       Cardiac History:    Past Surgical History:   Procedure Laterality Date   • CARDIAC CATHETERIZATION  09/10/2009    Dr. HOLLIDAY 50% LM, 90% LAD, 80% LCX, 70% Ramus, 80% RCA.   • CARDIAC CATHETERIZATION  07/25/2017    100% SVG to RCA. 2.25x8 Promus to RCA. 95% GUEVARA to Ramus- 2.25x12 Promus   • CARDIOVASCULAR STRESS TEST  06/28/2012    5 min 88% THR. Negative   • CARDIOVASCULAR STRESS TEST  02/03/2014    L. Myoview- negative  for ischemia   • CARDIOVASCULAR STRESS TEST  03/24/2016    Mod Elvis-9 min, 84% THR, 142/98, no definite ischemia, increase Metoprolol   • CARDIOVASCULAR STRESS TEST  10/25/2016    EF 65%, no ischemia   • CARDIOVASCULAR STRESS TEST  02/12/2018    L. Cardiolite- EF 65%. Small Inferoseptal Ischemia.   • CONVERTED (HISTORICAL) HOLTER  08/11/2015    baseline sinus 66, no arrhythmias, rare PVC, PAC   • CORONARY ARTERY BYPASS GRAFT  09/10/2009    CROOK-LAD, GUEVARA-Ramus, seq SVG-Om1, OM2. SVG to OM3. SVG to PDA. PFO closure. TV repair   • ECHO - CONVERTED  09/10/2009    ([facility]) Dr. Lucero EF 55%. Anteroseptal WMA   • ECHO - CONVERTED  02/03/2014    EF 55-60%, mild MR, no pericardial effusion   • ECHO - CONVERTED  03/24/2016    EF 55-60%, mild MR, RVSP 19 mmHg, tachycardia   • ECHO - CONVERTED  10/25/2016    EF 55-60%, mild MR   • ECHO - CONVERTED  02/12/2018    EF 65%. No shunt   • OTHER SURGICAL HISTORY  07/14/2015    MRI brain- no evidence of CVA or hemorrhage   • US CAROTID UNILATERAL  07/22/2015    no sig stenosis       Current Outpatient Medications   Medication Sig Dispense Refill   • aspirin (MADYSON LOW DOSE) 81 MG EC tablet TAKE 1 TABLET BY MOUTH once DAILY 60 tablet 11   • atorvastatin (LIPITOR) 20 MG tablet Take 1 tablet by mouth Daily. 30 tablet 8   • clopidogrel (PLAVIX) 75 MG tablet Take 1 tablet by mouth Daily. 30 tablet 8   • levothyroxine (SYNTHROID, LEVOTHROID) 75 MCG tablet Take 75 mcg by mouth Daily.     • metoclopramide (REGLAN) 10 MG tablet Take 10 mg by mouth 2 (Two) Times a Day.     • metoprolol succinate XL (TOPROL-XL) 25 MG 24 hr tablet TAKE 1/2 (ONE-HALF) TABLET BY MOUTH ONCE DAILY 15 tablet 8   • nitroglycerin (NITROSTAT) 0.4 MG SL tablet DISSOLVE ONE TABLET UNDER THE TONGUE EVERY 5 MINUTES AS NEEDED FOR CHEST PAIN.  DO NOT EXCEED A TOTAL OF 3 DOSES IN 15 MINUTES 25 tablet 0   • oxybutynin XL (DITROPAN-XL) 10 MG 24 hr tablet Take 10 mg by mouth Daily.     • pantoprazole (PROTONIX) 20 MG EC tablet Take  20 mg by mouth Daily.       No current facility-administered medications for this visit.        Patient has no known allergies.    Past Medical History:   Diagnosis Date   • Anxiety    • CAD (coronary artery disease)     s/p cabg   • Depression    • GERD (gastroesophageal reflux disease)    • H/O breast biopsy     benign   • Hypercholesterolemia    • Hyperlipidemia    • Hypertension    • Hypothyroidism    • Insomnia        Social History     Socioeconomic History   • Marital status: Single     Spouse name: Not on file   • Number of children: Not on file   • Years of education: Not on file   • Highest education level: Not on file   Tobacco Use   • Smoking status: Former Smoker     Last attempt to quit:      Years since quittin.6   • Smokeless tobacco: Never Used   • Tobacco comment: 1-2 ppd for 35 years   Substance and Sexual Activity   • Alcohol use: No   • Drug use: No       Family History   Problem Relation Age of Onset   • Heart attack Father        Review of Systems   Constitution: Negative for decreased appetite, diaphoresis and malaise/fatigue.   HENT: Negative for nosebleeds.    Eyes: Negative for blurred vision.   Cardiovascular: Negative for chest pain, claudication, cyanosis, dyspnea on exertion, irregular heartbeat, leg swelling, near-syncope, orthopnea, palpitations, paroxysmal nocturnal dyspnea and syncope.   Respiratory: Negative for shortness of breath.    Endocrine: Negative for cold intolerance and heat intolerance.   Hematologic/Lymphatic: Negative for adenopathy. Does not bruise/bleed easily.   Skin: Negative for rash.   Musculoskeletal: Negative for myalgias.   Gastrointestinal: Negative for heartburn, melena and nausea.   Genitourinary: Negative for dysuria and hematuria.   Neurological: Negative for dizziness and light-headedness.   Psychiatric/Behavioral: The patient does not have insomnia and is not nervous/anxious.         Objective     /70 (BP Location: Left arm)   Pulse 72   " Temp 97.8 °F (36.6 °C)   Ht 166.4 cm (65.51\")   Wt 76.4 kg (168 lb 6.4 oz)   BMI 27.59 kg/m²     Physical Exam   Constitutional: She is oriented to person, place, and time. She appears well-nourished.   HENT:   Head: Normocephalic.   Eyes: Pupils are equal, round, and reactive to light.   Neck: Normal range of motion. Carotid bruit is not present.   Cardiovascular: Normal rate, regular rhythm, S1 normal and S2 normal.   Murmur heard.   Systolic murmur is present with a grade of 2/6.  Pulmonary/Chest: Breath sounds normal. She has no wheezes. She has no rales. She exhibits tenderness.   Abdominal: Soft. Bowel sounds are normal.   Musculoskeletal: Normal range of motion. She exhibits no edema.   Neurological: She is alert and oriented to person, place, and time.   Skin: Skin is warm.   Psychiatric: She has a normal mood and affect.        Procedures      Problem List Items Addressed This Visit        Cardiovascular and Mediastinum    CAD (coronary artery disease)    HTN (hypertension)    Hyperlipemia       Other    Hx of CABG    History of tricuspid valve repair      Other Visit Diagnoses     Chest wall pain               1. CAD- stable, asymptomatic; s/p multivessel CABG 9/2009; 100% SVG to RCA, 95% GUEVARA to ramus, s/p stent to RCA and ramus, 7/2017. Repeat nuclear stress 2/2018 showed small inferoseptal ischemia, EF 65%. Continue medical management with cath if symptoms persist. She has no anginal symptoms. She is taking neither Imdur nor Ranexa and remains asymptomatic. Continue Plavix, aspirin, statin, BB. SL nitro PRN.     2.  HTN- controlled, continue current medications. Limit sodium. Heart healthy diet. Limit sugar/starches for BMI closer to 25.      3. Hyperlipidemia- managed with atorvastatin.  Labs per PCP, please forward copy of lab results available.    4.  Cardiac murmur-clinically stable.  At next visit we will consider repeat echocardiogram due to length of time and cardiac history.    5.  Chest " wall pain- patient admits to mild tenderness and soreness in the right side of upper sternal area.  Advised her to try regular dose Tylenol.  If symptoms persist or worsen she understands to follow-up with you.    Patient's Body mass index is 27.59 kg/m². BMI is above normal parameters. Recommendations include: nutrition counseling.     Amina Nath  reports that she quit smoking about 11 years ago. She has never used smokeless tobacco.  I complemented her on maintaining smoking cessation.    A 6-month follow-up visit scheduled.  Please call sooner for any cardiac concerns.           Electronically signed by Shayy Isaacs, PRACHI,  August 25, 2020 13:56

## 2020-11-12 ENCOUNTER — TELEPHONE (OUTPATIENT)
Dept: CARDIOLOGY | Facility: CLINIC | Age: 60
End: 2020-11-12

## 2020-11-12 NOTE — TELEPHONE ENCOUNTER
Received fax from [facility] for cardiac clearance for patient to have a colonoscopy and an EGD. Patient is on Plavix and they are requesting to hold. According to our records, patient's last stenting was done on 07/25/17.        Fax [phone]

## 2021-01-28 DIAGNOSIS — Z95.1 HX OF CABG: ICD-10-CM

## 2021-01-28 DIAGNOSIS — I25.10 CORONARY ARTERY DISEASE INVOLVING NATIVE CORONARY ARTERY OF NATIVE HEART WITHOUT ANGINA PECTORIS: ICD-10-CM

## 2021-01-28 DIAGNOSIS — I10 ESSENTIAL HYPERTENSION: ICD-10-CM

## 2021-02-01 RX ORDER — CLOPIDOGREL BISULFATE 75 MG/1
TABLET ORAL
Qty: 30 TABLET | Refills: 0 | Status: SHIPPED | OUTPATIENT
Start: 2021-02-01 | End: 2021-02-25 | Stop reason: SDUPTHER

## 2021-02-01 RX ORDER — METOPROLOL SUCCINATE 25 MG/1
TABLET, EXTENDED RELEASE ORAL
Qty: 15 TABLET | Refills: 0 | Status: SHIPPED | OUTPATIENT
Start: 2021-02-01 | End: 2021-02-25 | Stop reason: SDUPTHER

## 2021-02-25 ENCOUNTER — OFFICE VISIT (OUTPATIENT)
Dept: CARDIOLOGY | Facility: CLINIC | Age: 61
End: 2021-02-25

## 2021-02-25 VITALS
TEMPERATURE: 97.8 F | HEIGHT: 66 IN | BODY MASS INDEX: 28.12 KG/M2 | DIASTOLIC BLOOD PRESSURE: 86 MMHG | WEIGHT: 175 LBS | HEART RATE: 64 BPM | SYSTOLIC BLOOD PRESSURE: 134 MMHG

## 2021-02-25 DIAGNOSIS — E78.2 MIXED HYPERLIPIDEMIA: ICD-10-CM

## 2021-02-25 DIAGNOSIS — Z95.1 HX OF CABG: ICD-10-CM

## 2021-02-25 DIAGNOSIS — I10 ESSENTIAL HYPERTENSION: ICD-10-CM

## 2021-02-25 DIAGNOSIS — E66.3 OVERWEIGHT: ICD-10-CM

## 2021-02-25 DIAGNOSIS — R53.83 OTHER FATIGUE: ICD-10-CM

## 2021-02-25 DIAGNOSIS — R06.02 SHORTNESS OF BREATH: Primary | ICD-10-CM

## 2021-02-25 DIAGNOSIS — I20.8 ANGINAL EQUIVALENT (HCC): ICD-10-CM

## 2021-02-25 DIAGNOSIS — R01.1 HEART MURMUR: ICD-10-CM

## 2021-02-25 DIAGNOSIS — I25.10 CORONARY ARTERY DISEASE INVOLVING NATIVE CORONARY ARTERY OF NATIVE HEART WITHOUT ANGINA PECTORIS: ICD-10-CM

## 2021-02-25 PROCEDURE — 99214 OFFICE O/P EST MOD 30 MIN: CPT | Performed by: NURSE PRACTITIONER

## 2021-02-25 RX ORDER — CLOPIDOGREL BISULFATE 75 MG/1
75 TABLET ORAL DAILY
Qty: 90 TABLET | Refills: 2 | Status: SHIPPED | OUTPATIENT
Start: 2021-02-25 | End: 2021-08-30 | Stop reason: SDUPTHER

## 2021-02-25 RX ORDER — NITROGLYCERIN 0.4 MG/1
0.4 TABLET SUBLINGUAL
Qty: 25 TABLET | Refills: 0 | Status: SHIPPED | OUTPATIENT
Start: 2021-02-25 | End: 2022-03-09 | Stop reason: SDUPTHER

## 2021-02-25 RX ORDER — METOCLOPRAMIDE 10 MG/1
10 TABLET ORAL 2 TIMES DAILY
COMMUNITY

## 2021-02-25 RX ORDER — ATORVASTATIN CALCIUM 20 MG/1
20 TABLET, FILM COATED ORAL DAILY
Qty: 90 TABLET | Refills: 2
Start: 2021-02-25 | End: 2021-03-12

## 2021-02-25 RX ORDER — METOPROLOL SUCCINATE 25 MG/1
12.5 TABLET, EXTENDED RELEASE ORAL DAILY
Qty: 90 TABLET | Refills: 2 | Status: SHIPPED | OUTPATIENT
Start: 2021-02-25 | End: 2021-08-30 | Stop reason: SDUPTHER

## 2021-02-25 NOTE — PROGRESS NOTES
Chief Complaint   Patient presents with   • Follow-up     For cardiac management. Patient is on aspirin. Last lab work unknown, is unsure if she had any last year. States that she does have shortness of breath with walking, states from the time she gets from [employer] to [place] she is out of breath.    • Med Refill     Needs refills on cardiac medications including nitro. 90 day supplies to [facility] in [place]. Brought medication list with visit.        Cardiac Complaints  dyspnea      Subjective   Amina Nath is a 61 y.o. female with hypertension, hypothyroidism, major depressive disorder, and ischemic heart disease. In 2009 she was noted to have severe three-vessel disease and also possible endocarditis involving the tricuspid valve. She underwent bypass surgery, tricuspid valve repair as well as PFO closure at that time. In 2014 she established care here. She had episodes of syncope and metoprolol was temporarily held. Stress test in 2016 for CP did not show any ischemia. She continued to have discomfort, so cardiac cath was done revealing significant disease of SVG to RCA and GUEVARA to ramus and had stents placed in each graft in July 2017. Stress test on 2/12/18 showed small inferoseptal ischemia with Imdur added and plan for cath for continued symptoms. At her July 2018 office visit she had not started Imdur. Ranexa was added as better option due to lower bp.     Patient returns today for follow up and does report some issues with SOA after walking [place] [employer] [place]. She reports often having to rest after the short trip and admits this has gotten worse over the last several months. Chest pain, dizziness, syncope, and palpitations denied. Labs she can not recall last check. Refills requested.        Cardiac History  Past Surgical History:   Procedure Laterality Date   • CARDIAC CATHETERIZATION  09/10/2009    Dr. HOLLIDAY 50% LM, 90% LAD, 80% LCX, 70% Ramus, 80% RCA.   • CARDIAC  CATHETERIZATION  07/25/2017    100% SVG to RCA. 2.25x8 Promus to RCA. 95% GUEVARA to Ramus- 2.25x12 Promus   • CARDIOVASCULAR STRESS TEST  06/28/2012    5 min 88% THR. Negative   • CARDIOVASCULAR STRESS TEST  02/03/2014    L. Myoview- negative for ischemia   • CARDIOVASCULAR STRESS TEST  03/24/2016    Mod Elvis-9 min, 84% THR, 142/98, no definite ischemia, increase Metoprolol   • CARDIOVASCULAR STRESS TEST  10/25/2016    EF 65%, no ischemia   • CARDIOVASCULAR STRESS TEST  02/12/2018    L. Cardiolite- EF 65%. Small Inferoseptal Ischemia.   • CONVERTED (HISTORICAL) HOLTER  08/11/2015    baseline sinus 66, no arrhythmias, rare PVC, PAC   • CORONARY ARTERY BYPASS GRAFT  09/10/2009    CROOK-LAD, GUEVARA-Ramus, seq SVG-Om1, OM2. SVG to OM3. SVG to PDA. PFO closure. TV repair   • ECHO - CONVERTED  09/10/2009    ([facility]) Dr. Lucero EF 55%. Anteroseptal WMA   • ECHO - CONVERTED  02/03/2014    EF 55-60%, mild MR, no pericardial effusion   • ECHO - CONVERTED  03/24/2016    EF 55-60%, mild MR, RVSP 19 mmHg, tachycardia   • ECHO - CONVERTED  10/25/2016    EF 55-60%, mild MR   • ECHO - CONVERTED  02/12/2018    EF 65%. No shunt   • OTHER SURGICAL HISTORY  07/14/2015    MRI brain- no evidence of CVA or hemorrhage   • US CAROTID UNILATERAL  07/22/2015    no sig stenosis       Current Outpatient Medications   Medication Sig Dispense Refill   • aspirin (MADYSON LOW DOSE) 81 MG EC tablet TAKE 1 TABLET BY MOUTH once DAILY 60 tablet 11   • atorvastatin (LIPITOR) 20 MG tablet Take 1 tablet by mouth Daily. 90 tablet 2   • clopidogrel (PLAVIX) 75 MG tablet Take 1 tablet by mouth Daily. 90 tablet 2   • levothyroxine (SYNTHROID, LEVOTHROID) 75 MCG tablet Take 75 mcg by mouth Daily.     • metoclopramide (REGLAN) 10 MG tablet Take 10 mg by mouth 2 (Two) Times a Day.     • metoprolol succinate XL (TOPROL-XL) 25 MG 24 hr tablet Take 0.5 tablets by mouth Daily. 90 tablet 2   • nitroglycerin (NITROSTAT) 0.4 MG SL tablet Place 1 tablet under the tongue Every 5  (Five) Minutes As Needed for Chest Pain. do not exceed a total of 3 doses in 15 minutes 25 tablet 0   • oxybutynin XL (DITROPAN-XL) 10 MG 24 hr tablet Take 10 mg by mouth Daily.     • pantoprazole (PROTONIX) 20 MG EC tablet Take 20 mg by mouth Daily.       No current facility-administered medications for this visit.        Patient has no known allergies.    Past Medical History:   Diagnosis Date   • Anxiety    • CAD (coronary artery disease)     s/p cabg   • Depression    • GERD (gastroesophageal reflux disease)    • H/O breast biopsy     benign   • Hypercholesterolemia    • Hyperlipidemia    • Hypertension    • Hypothyroidism    • Insomnia        Social History     Socioeconomic History   • Marital status: Single     Spouse name: Not on file   • Number of children: Not on file   • Years of education: Not on file   • Highest education level: Not on file   Tobacco Use   • Smoking status: Former Smoker     Quit date:      Years since quittin.   • Smokeless tobacco: Never Used   • Tobacco comment: 1-2 ppd for 35 years   Substance and Sexual Activity   • Alcohol use: No   • Drug use: No       Family History   Problem Relation Age of Onset   • Heart attack Father        Review of Systems   Constitution: Negative for malaise/fatigue and night sweats.   Cardiovascular: Positive for dyspnea on exertion. Negative for chest pain, claudication, irregular heartbeat, leg swelling, near-syncope, orthopnea, palpitations and syncope.   Respiratory: Positive for shortness of breath. Negative for cough and wheezing.    Musculoskeletal: Positive for stiffness. Negative for back pain and joint pain.   Gastrointestinal: Negative for anorexia, heartburn, melena, nausea and vomiting.   Genitourinary: Negative for dysuria, hematuria, hesitancy and nocturia.   Neurological: Negative for dizziness, light-headedness and loss of balance.   Psychiatric/Behavioral: Negative for depression and memory loss. The patient is not  "nervous/anxious.            Objective     /86 (BP Location: Left arm)   Pulse 64   Temp 97.8 °F (36.6 °C)   Ht 166.4 cm (65.51\")   Wt 79.4 kg (175 lb)   BMI 28.67 kg/m²     Constitutional:       Appearance: Healthy appearance. Not in distress.   Eyes:      Pupils: Pupils are equal, round, and reactive to light.   HENT:      Nose: Nose normal.   Neck:      Musculoskeletal: Normal range of motion and neck supple.   Pulmonary:      Effort: Pulmonary effort is normal.      Breath sounds: Normal breath sounds.   Cardiovascular:      PMI at left midclavicular line. Normal rate. Regular rhythm.      Murmurs: There is a systolic murmur.   Abdominal:      Palpations: Abdomen is soft.   Musculoskeletal: Normal range of motion.   Skin:     General: Skin is warm and dry.   Neurological:      Mental Status: Oriented to person, place and time.         Procedures    Assessment/Plan     CAD:  Hx of CABG. More SOA and fatigue noted. Last stress in 2018 showed small inferoseptal ischemia, DAPT therapy continued. No use of SL NTG has been required but she does have if needed. Stress and echo recommended to assess for any ischemic burden, LV dysfunction, or valvular concerns since anginal equivalents present. More recommendations to follow.    HTN:  Well managed on current. No adjustment to current toprol therapy recommended. Limited sodium advised.     Hyperlipidemia:  On statin therapy with lipitor. She has tolerated well. FLP has not been rechecked. Order will be provided with more recommendations to follow.     Murmur:  Noted.   Repeat echo advised to assess valvular areas. More recommendations to follow.    BMI noted at 28.67, good cardiac diet with limited carbs, calories, and activity as tolerated advised. Walking regimen encouraged to continue.    Refills per request.    6 month follow up recommended or sooner if needed.      Problems Addressed this Visit        Cardiac and Vasculature    CAD (coronary artery " disease)    Relevant Medications    clopidogrel (PLAVIX) 75 MG tablet    metoprolol succinate XL (TOPROL-XL) 25 MG 24 hr tablet    nitroglycerin (NITROSTAT) 0.4 MG SL tablet    Other Relevant Orders    Stress Test With Myocardial Perfusion One Day    Adult Transthoracic Echo Complete W/ Cont if Necessary Per Protocol    CBC (No Diff)    Comprehensive Metabolic Panel    HTN (hypertension)    Relevant Medications    metoprolol succinate XL (TOPROL-XL) 25 MG 24 hr tablet    Other Relevant Orders    CBC (No Diff)    Comprehensive Metabolic Panel    TSH    Hyperlipemia    Relevant Medications    atorvastatin (LIPITOR) 20 MG tablet    Other Relevant Orders    CBC (No Diff)    Comprehensive Metabolic Panel    TSH    Lipid Panel    Hx of CABG    Relevant Medications    clopidogrel (PLAVIX) 75 MG tablet    Other Relevant Orders    Stress Test With Myocardial Perfusion One Day    Adult Transthoracic Echo Complete W/ Cont if Necessary Per Protocol      Other Visit Diagnoses     Shortness of breath    -  Primary    Relevant Orders    Stress Test With Myocardial Perfusion One Day    Adult Transthoracic Echo Complete W/ Cont if Necessary Per Protocol    Comprehensive Metabolic Panel    proBNP    Anginal equivalent (CMS/HCC)        Relevant Medications    clopidogrel (PLAVIX) 75 MG tablet    metoprolol succinate XL (TOPROL-XL) 25 MG 24 hr tablet    nitroglycerin (NITROSTAT) 0.4 MG SL tablet    Other Relevant Orders    Stress Test With Myocardial Perfusion One Day    Adult Transthoracic Echo Complete W/ Cont if Necessary Per Protocol    CBC (No Diff)    Comprehensive Metabolic Panel    Heart murmur        Relevant Orders    Adult Transthoracic Echo Complete W/ Cont if Necessary Per Protocol    Other fatigue        Relevant Orders    proBNP    Overweight          Diagnoses       Codes Comments    Shortness of breath    -  Primary ICD-10-CM: R06.02  ICD-9-CM: 786.05     Coronary artery disease involving native coronary artery of  native heart without angina pectoris     ICD-10-CM: I25.10  ICD-9-CM: 414.01     Anginal equivalent (CMS/HCC)     ICD-10-CM: I20.8  ICD-9-CM: 413.9     Hx of CABG     ICD-10-CM: Z95.1  ICD-9-CM: V45.81     Heart murmur     ICD-10-CM: R01.1  ICD-9-CM: 785.2     Mixed hyperlipidemia     ICD-10-CM: E78.2  ICD-9-CM: 272.2     Essential hypertension     ICD-10-CM: I10  ICD-9-CM: 401.9     Other fatigue     ICD-10-CM: R53.83  ICD-9-CM: 780.79     Overweight     ICD-10-CM: E66.3  ICD-9-CM: 278.02           Patient's Body mass index is 28.67 kg/m². BMI is above normal parameters. Recommendations include: nutrition counseling.            Electronically signed by Margarita Rosas, PRACHI February 25, 2021 17:18 EST

## 2021-03-10 ENCOUNTER — HOSPITAL ENCOUNTER (OUTPATIENT)
Dept: CARDIOLOGY | Facility: HOSPITAL | Age: 61
Discharge: HOME | End: 2021-03-10

## 2021-03-10 ENCOUNTER — LAB (OUTPATIENT)
Dept: LAB | Facility: HOSPITAL | Age: 61
End: 2021-03-10

## 2021-03-10 DIAGNOSIS — Z95.1 HX OF CABG: ICD-10-CM

## 2021-03-10 DIAGNOSIS — E78.2 MIXED HYPERLIPIDEMIA: ICD-10-CM

## 2021-03-10 DIAGNOSIS — I20.8 ANGINAL EQUIVALENT (HCC): ICD-10-CM

## 2021-03-10 DIAGNOSIS — R53.83 OTHER FATIGUE: ICD-10-CM

## 2021-03-10 DIAGNOSIS — R06.02 SHORTNESS OF BREATH: ICD-10-CM

## 2021-03-10 DIAGNOSIS — I25.10 CORONARY ARTERY DISEASE INVOLVING NATIVE CORONARY ARTERY OF NATIVE HEART WITHOUT ANGINA PECTORIS: ICD-10-CM

## 2021-03-10 DIAGNOSIS — I10 ESSENTIAL HYPERTENSION: ICD-10-CM

## 2021-03-10 DIAGNOSIS — R01.1 HEART MURMUR: ICD-10-CM

## 2021-03-10 LAB
ALBUMIN SERPL-MCNC: 4.23 G/DL (ref 3.5–5.2)
ALBUMIN/GLOB SERPL: 1.5 G/DL
ALP SERPL-CCNC: 94 U/L (ref 39–117)
ALT SERPL W P-5'-P-CCNC: 24 U/L (ref 1–33)
ANION GAP SERPL CALCULATED.3IONS-SCNC: 14.1 MMOL/L (ref 5–15)
AST SERPL-CCNC: 46 U/L (ref 1–32)
BH CV ECHO MEAS - ACS: 1.1 CM
BH CV ECHO MEAS - AO MAX PG: 6.9 MMHG
BH CV ECHO MEAS - AO MEAN PG: 4 MMHG
BH CV ECHO MEAS - AO ROOT AREA (BSA CORRECTED): 1.4
BH CV ECHO MEAS - AO ROOT AREA: 5.5 CM^2
BH CV ECHO MEAS - AO ROOT DIAM: 2.7 CM
BH CV ECHO MEAS - AO V2 MAX: 131 CM/SEC
BH CV ECHO MEAS - AO V2 MEAN: 97.8 CM/SEC
BH CV ECHO MEAS - AO V2 VTI: 25.7 CM
BH CV ECHO MEAS - BSA(HAYCOCK): 1.9 M^2
BH CV ECHO MEAS - BSA: 1.9 M^2
BH CV ECHO MEAS - BZI_BMI: 29.1 KILOGRAMS/M^2
BH CV ECHO MEAS - BZI_METRIC_HEIGHT: 165.1 CM
BH CV ECHO MEAS - BZI_METRIC_WEIGHT: 79.4 KG
BH CV ECHO MEAS - EDV(CUBED): 74.6 ML
BH CV ECHO MEAS - EDV(MOD-SP4): 21.9 ML
BH CV ECHO MEAS - EDV(TEICH): 79 ML
BH CV ECHO MEAS - EF(CUBED): 75.3 %
BH CV ECHO MEAS - EF(MOD-SP2): 67.6 %
BH CV ECHO MEAS - EF(MOD-SP4): 88.3 %
BH CV ECHO MEAS - EF(TEICH): 67.7 %
BH CV ECHO MEAS - ESV(CUBED): 18.4 ML
BH CV ECHO MEAS - ESV(MOD-SP4): 2.6 ML
BH CV ECHO MEAS - ESV(TEICH): 25.6 ML
BH CV ECHO MEAS - FS: 37.3 %
BH CV ECHO MEAS - IVS/LVPW: 0.59
BH CV ECHO MEAS - IVSD: 0.71 CM
BH CV ECHO MEAS - LA DIMENSION: 1.6 CM
BH CV ECHO MEAS - LA/AO: 0.6
BH CV ECHO MEAS - LV DIASTOLIC VOL/BSA (35-75): 11.7 ML/M^2
BH CV ECHO MEAS - LV IVRT: 0.1 SEC
BH CV ECHO MEAS - LV MASS(C)D: 128.9 GRAMS
BH CV ECHO MEAS - LV MASS(C)DI: 68.9 GRAMS/M^2
BH CV ECHO MEAS - LV SYSTOLIC VOL/BSA (12-30): 1.4 ML/M^2
BH CV ECHO MEAS - LVIDD: 4.2 CM
BH CV ECHO MEAS - LVIDS: 2.6 CM
BH CV ECHO MEAS - LVLD AP4: 5.7 CM
BH CV ECHO MEAS - LVLS AP4: 4 CM
BH CV ECHO MEAS - LVOT AREA (M): 2 CM^2
BH CV ECHO MEAS - LVOT AREA: 2 CM^2
BH CV ECHO MEAS - LVOT DIAM: 1.6 CM
BH CV ECHO MEAS - LVPWD: 1.2 CM
BH CV ECHO MEAS - MV A MAX VEL: 84.4 CM/SEC
BH CV ECHO MEAS - MV DEC TIME: 0.28 SEC
BH CV ECHO MEAS - MV E MAX VEL: 63.8 CM/SEC
BH CV ECHO MEAS - MV E/A: 0.76
BH CV ECHO MEAS - PA ACC TIME: 0.11 SEC
BH CV ECHO MEAS - PA PR(ACCEL): 27.7 MMHG
BH CV ECHO MEAS - RAP SYSTOLE: 10 MMHG
BH CV ECHO MEAS - RVSP: 20.2 MMHG
BH CV ECHO MEAS - SI(AO): 75.8 ML/M^2
BH CV ECHO MEAS - SI(CUBED): 30.1 ML/M^2
BH CV ECHO MEAS - SI(MOD-SP4): 10.3 ML/M^2
BH CV ECHO MEAS - SI(TEICH): 28.6 ML/M^2
BH CV ECHO MEAS - SV(AO): 141.7 ML
BH CV ECHO MEAS - SV(CUBED): 56.2 ML
BH CV ECHO MEAS - SV(MOD-SP4): 19.3 ML
BH CV ECHO MEAS - SV(TEICH): 53.5 ML
BH CV ECHO MEAS - TR MAX VEL: 160 CM/SEC
BH CV REST NUCLEAR ISOTOPE DOSE: 10 MCI
BH CV STRESS COMMENTS STAGE 1: NORMAL
BH CV STRESS DOSE REGADENOSON STAGE 1: 0.4
BH CV STRESS DURATION MIN STAGE 1: 0
BH CV STRESS DURATION SEC STAGE 1: 10
BH CV STRESS NUCLEAR ISOTOPE DOSE: 30 MCI
BH CV STRESS PROTOCOL 1: NORMAL
BH CV STRESS RECOVERY BP: NORMAL MMHG
BH CV STRESS RECOVERY HR: 85 BPM
BH CV STRESS STAGE 1: 1
BILIRUB SERPL-MCNC: 0.6 MG/DL (ref 0–1.2)
BUN SERPL-MCNC: 12 MG/DL (ref 8–23)
BUN/CREAT SERPL: 11.8 (ref 7–25)
CALCIUM SPEC-SCNC: 9.6 MG/DL (ref 8.6–10.5)
CHLORIDE SERPL-SCNC: 105 MMOL/L (ref 98–107)
CHOLEST SERPL-MCNC: 229 MG/DL (ref 0–200)
CO2 SERPL-SCNC: 18.9 MMOL/L (ref 22–29)
CREAT SERPL-MCNC: 1.02 MG/DL (ref 0.57–1)
DEPRECATED RDW RBC AUTO: 44 FL (ref 37–54)
ERYTHROCYTE [DISTWIDTH] IN BLOOD BY AUTOMATED COUNT: 12.2 % (ref 12.3–15.4)
GFR SERPL CREATININE-BSD FRML MDRD: 55 ML/MIN/1.73
GLOBULIN UR ELPH-MCNC: 2.9 GM/DL
GLUCOSE SERPL-MCNC: 104 MG/DL (ref 65–99)
HCT VFR BLD AUTO: 44.4 % (ref 34–46.6)
HDLC SERPL-MCNC: 39 MG/DL (ref 40–60)
HGB BLD-MCNC: 14.8 G/DL (ref 12–15.9)
LDLC SERPL CALC-MCNC: 155 MG/DL (ref 0–100)
LDLC/HDLC SERPL: 3.89 {RATIO}
LV EF NUC BP: 78 %
MAXIMAL PREDICTED HEART RATE: 160 BPM
MAXIMAL PREDICTED HEART RATE: 160 BPM
MCH RBC QN AUTO: 32.7 PG (ref 26.6–33)
MCHC RBC AUTO-ENTMCNC: 33.3 G/DL (ref 31.5–35.7)
MCV RBC AUTO: 98.2 FL (ref 79–97)
NT-PROBNP SERPL-MCNC: 33.5 PG/ML (ref 0–900)
PERCENT MAX PREDICTED HR: 66.88 %
PLATELET # BLD AUTO: 243 10*3/MM3 (ref 140–450)
PMV BLD AUTO: 11.1 FL (ref 6–12)
POTASSIUM SERPL-SCNC: 4.1 MMOL/L (ref 3.5–5.2)
PROT SERPL-MCNC: 7.1 G/DL (ref 6–8.5)
RBC # BLD AUTO: 4.52 10*6/MM3 (ref 3.77–5.28)
SODIUM SERPL-SCNC: 138 MMOL/L (ref 136–145)
STRESS BASELINE BP: NORMAL MMHG
STRESS BASELINE HR: 78 BPM
STRESS PERCENT HR: 79 %
STRESS POST PEAK BP: NORMAL MMHG
STRESS POST PEAK HR: 107 BPM
STRESS TARGET HR: 136 BPM
STRESS TARGET HR: 136 BPM
TRIGL SERPL-MCNC: 191 MG/DL (ref 0–150)
TSH SERPL DL<=0.05 MIU/L-ACNC: 1.59 UIU/ML (ref 0.27–4.2)
VLDLC SERPL-MCNC: 35 MG/DL (ref 5–40)
WBC # BLD AUTO: 8.54 10*3/MM3 (ref 3.4–10.8)

## 2021-03-10 PROCEDURE — 93306 TTE W/DOPPLER COMPLETE: CPT

## 2021-03-10 PROCEDURE — 0 TECHNETIUM SESTAMIBI: Performed by: INTERNAL MEDICINE

## 2021-03-10 PROCEDURE — 93017 CV STRESS TEST TRACING ONLY: CPT

## 2021-03-10 PROCEDURE — A9500 TC99M SESTAMIBI: HCPCS | Performed by: INTERNAL MEDICINE

## 2021-03-10 PROCEDURE — 25010000002 REGADENOSON 0.4 MG/5ML SOLUTION: Performed by: INTERNAL MEDICINE

## 2021-03-10 PROCEDURE — 78452 HT MUSCLE IMAGE SPECT MULT: CPT | Performed by: INTERNAL MEDICINE

## 2021-03-10 PROCEDURE — 78452 HT MUSCLE IMAGE SPECT MULT: CPT

## 2021-03-10 PROCEDURE — 36415 COLL VENOUS BLD VENIPUNCTURE: CPT

## 2021-03-10 PROCEDURE — 83880 ASSAY OF NATRIURETIC PEPTIDE: CPT

## 2021-03-10 PROCEDURE — 80061 LIPID PANEL: CPT

## 2021-03-10 PROCEDURE — 93306 TTE W/DOPPLER COMPLETE: CPT | Performed by: INTERNAL MEDICINE

## 2021-03-10 PROCEDURE — 80050 GENERAL HEALTH PANEL: CPT

## 2021-03-10 PROCEDURE — 93018 CV STRESS TEST I&R ONLY: CPT | Performed by: INTERNAL MEDICINE

## 2021-03-10 RX ADMIN — REGADENOSON 0.4 MG: 0.08 INJECTION, SOLUTION INTRAVENOUS at 10:37

## 2021-03-10 RX ADMIN — TECHNETIUM TC 99M SESTAMIBI 1 DOSE: 1 INJECTION INTRAVENOUS at 08:38

## 2021-03-10 RX ADMIN — TECHNETIUM TC 99M SESTAMIBI 1 DOSE: 1 INJECTION INTRAVENOUS at 10:37

## 2021-03-10 NOTE — PROGRESS NOTES
Change lipitor 10mg to crestor 10mg QHS. LDL high at 155, AST slightly elevated. Please advise to limit sugar/starch/fried/fatty foods. Send to PCP.  BNP normal

## 2021-03-11 NOTE — TELEPHONE ENCOUNTER
----- Message from PRACHI Parada sent at 3/10/2021  2:40 PM EST -----  Change lipitor 10mg to crestor 10mg QHS. LDL high at 155, AST slightly elevated. Please advise to limit sugar/starch/fried/fatty foods. Send to PCP.  BNP normal

## 2021-03-11 NOTE — TELEPHONE ENCOUNTER
Patient was made aware of results of lab work. Patient was made aware to change lipitor to crestor 10 mg QHS. Patient was made aware to limit sugars/starch/fried and fatty foods.       Script pended to be sent into [facility] in [place] with 90 tablets and 3 refills.

## 2021-03-12 RX ORDER — ROSUVASTATIN CALCIUM 10 MG/1
10 TABLET, COATED ORAL NIGHTLY
Qty: 90 TABLET | Refills: 3 | Status: SHIPPED | OUTPATIENT
Start: 2021-03-12 | End: 2021-08-30

## 2021-03-21 DIAGNOSIS — Z95.1 HX OF CABG: ICD-10-CM

## 2021-03-21 DIAGNOSIS — I25.10 CORONARY ARTERY DISEASE INVOLVING NATIVE CORONARY ARTERY OF NATIVE HEART WITHOUT ANGINA PECTORIS: ICD-10-CM

## 2021-03-22 DIAGNOSIS — Z95.1 HX OF CABG: ICD-10-CM

## 2021-03-22 DIAGNOSIS — I25.10 CORONARY ARTERY DISEASE INVOLVING NATIVE CORONARY ARTERY OF NATIVE HEART WITHOUT ANGINA PECTORIS: ICD-10-CM

## 2021-03-22 RX ORDER — ASPIRIN 81 MG/1
TABLET ORAL
Qty: 60 TABLET | Refills: 11 | Status: SHIPPED | OUTPATIENT
Start: 2021-03-22 | End: 2022-06-07

## 2021-03-22 RX ORDER — ASPIRIN 81 MG/1
TABLET ORAL
Qty: 90 TABLET | Refills: 3 | OUTPATIENT
Start: 2021-03-22

## 2021-08-30 ENCOUNTER — OFFICE VISIT (OUTPATIENT)
Dept: CARDIOLOGY | Facility: CLINIC | Age: 61
End: 2021-08-30

## 2021-08-30 VITALS
WEIGHT: 176.2 LBS | DIASTOLIC BLOOD PRESSURE: 68 MMHG | HEIGHT: 66 IN | BODY MASS INDEX: 28.32 KG/M2 | HEART RATE: 62 BPM | SYSTOLIC BLOOD PRESSURE: 100 MMHG

## 2021-08-30 DIAGNOSIS — E78.2 MIXED HYPERLIPIDEMIA: Primary | ICD-10-CM

## 2021-08-30 DIAGNOSIS — I25.10 CORONARY ARTERY DISEASE INVOLVING NATIVE CORONARY ARTERY OF NATIVE HEART WITHOUT ANGINA PECTORIS: ICD-10-CM

## 2021-08-30 DIAGNOSIS — R06.02 SHORTNESS OF BREATH: ICD-10-CM

## 2021-08-30 DIAGNOSIS — E03.9 ACQUIRED HYPOTHYROIDISM: ICD-10-CM

## 2021-08-30 DIAGNOSIS — Z95.1 HX OF CABG: ICD-10-CM

## 2021-08-30 DIAGNOSIS — R01.1 HEART MURMUR: ICD-10-CM

## 2021-08-30 DIAGNOSIS — I10 ESSENTIAL HYPERTENSION: ICD-10-CM

## 2021-08-30 DIAGNOSIS — E66.3 OVERWEIGHT: ICD-10-CM

## 2021-08-30 PROCEDURE — 99213 OFFICE O/P EST LOW 20 MIN: CPT | Performed by: NURSE PRACTITIONER

## 2021-08-30 RX ORDER — CLOPIDOGREL BISULFATE 75 MG/1
75 TABLET ORAL DAILY
Qty: 90 TABLET | Refills: 2 | Status: SHIPPED | OUTPATIENT
Start: 2021-08-30 | End: 2022-03-09 | Stop reason: SDUPTHER

## 2021-08-30 RX ORDER — PANTOPRAZOLE SODIUM 20 MG/1
20 TABLET, DELAYED RELEASE ORAL DAILY
Qty: 90 TABLET | Refills: 2 | Status: SHIPPED | OUTPATIENT
Start: 2021-08-30 | End: 2022-03-09 | Stop reason: SDUPTHER

## 2021-08-30 RX ORDER — ATORVASTATIN CALCIUM 20 MG/1
20 TABLET, FILM COATED ORAL DAILY
Qty: 90 TABLET | Refills: 2 | Status: SHIPPED | OUTPATIENT
Start: 2021-08-30 | End: 2022-03-09 | Stop reason: SDUPTHER

## 2021-08-30 RX ORDER — METOPROLOL SUCCINATE 25 MG/1
12.5 TABLET, EXTENDED RELEASE ORAL DAILY
Qty: 90 TABLET | Refills: 2 | Status: SHIPPED | OUTPATIENT
Start: 2021-08-30 | End: 2022-03-09

## 2021-08-30 RX ORDER — ATORVASTATIN CALCIUM 20 MG/1
20 TABLET, FILM COATED ORAL DAILY
COMMUNITY
End: 2021-08-30 | Stop reason: SDUPTHER

## 2021-08-30 NOTE — PROGRESS NOTES
Chief Complaint   Patient presents with   • Follow-up     for cardiac management, complains of shortness of breath mainly when walking.    • labs     had labs done in March.    • Med Refill     no medication refills needed at today's visit       Cardiac Complaints  dyspnea      Subjective   Amina Nath is a 61 y.o. female with hypertension, hypothyroidism, major depressive disorder, and ischemic heart disease. In 2009 she was noted to have severe three-vessel disease and also possible endocarditis involving the tricuspid valve. She underwent bypass surgery, tricuspid valve repair as well as PFO closure at that time. In 2014 she established care here. She had episodes of syncope and metoprolol was temporarily held. Stress test in 2016 for CP did not show any ischemia. She continued to have discomfort, so cardiac cath was done revealing significant disease of SVG to RCA and GUEVARA to ramus and had stents placed in each graft in July 2017. Stress test on 2/12/18 showed small inferoseptal ischemia with Imdur added and plan for cath for continued symptoms. At her July 2018 office visit she had not started Imdur. Ranexa was added as better option due to lower BP. At last visit, she reported more SOA, workup advised. Echo done 7/2021 showed valve areas stable and normal LV function. Stress testing done showed no ischemia and good LV function.     Patient returns today for follow up and denies any new concerns. She does have some SOA with walking but no worse than prior. Labs remain followed by PCP and were last done in March. No refills are currently needed.         Cardiac History  Past Surgical History:   Procedure Laterality Date   • CARDIAC CATHETERIZATION  09/10/2009    Dr. HOLLIDAY 50% LM, 90% LAD, 80% LCX, 70% Ramus, 80% RCA.   • CARDIAC CATHETERIZATION  07/25/2017    100% SVG to RCA. 2.25x8 Promus to RCA. 95% GUEVARA to Ramus- 2.25x12 Promus   • CARDIOVASCULAR STRESS TEST  06/28/2012    5 min 88% THR. Negative   •  CARDIOVASCULAR STRESS TEST  02/03/2014    L. Myoview- negative for ischemia   • CARDIOVASCULAR STRESS TEST  03/24/2016    Mod Elvis-9 min, 84% THR, 142/98, no definite ischemia, increase Metoprolol   • CARDIOVASCULAR STRESS TEST  10/25/2016    EF 65%, no ischemia   • CARDIOVASCULAR STRESS TEST  02/12/2018    L. Cardiolite- EF 65%. Small Inferoseptal Ischemia.   • CARDIOVASCULAR STRESS TEST  03/10/2021    Lexiscan- EF > 70%. Negative   • COLONOSCOPY  2021   • CONVERTED (HISTORICAL) HOLTER  08/11/2015    baseline sinus 66, no arrhythmias, rare PVC, PAC   • CORONARY ARTERY BYPASS GRAFT  09/10/2009    CROOK-LAD, GUEVARA-Ramus, seq SVG-Om1, OM2. SVG to OM3. SVG to PDA. PFO closure. TV repair   • ECHO - CONVERTED  09/10/2009    ([facility]) Dr. Lucero EF 55%. Anteroseptal WMA   • ECHO - CONVERTED  02/03/2014    EF 55-60%, mild MR, no pericardial effusion   • ECHO - CONVERTED  03/24/2016    EF 55-60%, mild MR, RVSP 19 mmHg, tachycardia   • ECHO - CONVERTED  10/25/2016    EF 55-60%, mild MR   • ECHO - CONVERTED  02/12/2018    EF 65%. No shunt   • ECHO - CONVERTED  03/10/2021    EF 65%. Trace-Mild MR   • ENDOSCOPY  2021   • OTHER SURGICAL HISTORY  07/14/2015    MRI brain- no evidence of CVA or hemorrhage   • US CAROTID UNILATERAL  07/22/2015    no sig stenosis       Current Outpatient Medications   Medication Sig Dispense Refill   • aspirin (aspirin) 81 MG EC tablet TAKE 1 TABLET BY MOUTH once DAILY 60 tablet 11   • atorvastatin (LIPITOR) 20 MG tablet Take 1 tablet by mouth Daily. 90 tablet 2   • clopidogrel (PLAVIX) 75 MG tablet Take 1 tablet by mouth Daily. 90 tablet 2   • levothyroxine (SYNTHROID, LEVOTHROID) 75 MCG tablet Take 75 mcg by mouth Daily.     • metoclopramide (REGLAN) 10 MG tablet Take 10 mg by mouth 2 (Two) Times a Day.     • metoprolol succinate XL (TOPROL-XL) 25 MG 24 hr tablet Take 0.5 tablets by mouth Daily. 90 tablet 2   • nitroglycerin (NITROSTAT) 0.4 MG SL tablet Place 1 tablet under the tongue Every 5 (Five)  Minutes As Needed for Chest Pain. do not exceed a total of 3 doses in 15 minutes 25 tablet 0   • oxybutynin XL (DITROPAN-XL) 10 MG 24 hr tablet Take 10 mg by mouth Daily.     • pantoprazole (PROTONIX) 20 MG EC tablet Take 1 tablet by mouth Daily. 90 tablet 2     No current facility-administered medications for this visit.       Patient has no known allergies.    Past Medical History:   Diagnosis Date   • Anxiety    • CAD (coronary artery disease)     s/p cabg   • Depression    • GERD (gastroesophageal reflux disease)    • H/O breast biopsy     benign   • Hypercholesterolemia    • Hyperlipidemia    • Hypertension    • Hypothyroidism    • Insomnia        Social History     Socioeconomic History   • Marital status: Single     Spouse name: Not on file   • Number of children: Not on file   • Years of education: Not on file   • Highest education level: Not on file   Tobacco Use   • Smoking status: Former Smoker     Quit date:      Years since quittin.6   • Smokeless tobacco: Never Used   • Tobacco comment: 1-2 ppd for 35 years   Vaping Use   • Vaping Use: Never used   Substance and Sexual Activity   • Alcohol use: No   • Drug use: No       Family History   Problem Relation Age of Onset   • Heart attack Father        Review of Systems   Constitutional: Negative for malaise/fatigue and night sweats.   Cardiovascular: Negative for chest pain, claudication, dyspnea on exertion, irregular heartbeat, leg swelling, near-syncope, orthopnea, palpitations and syncope.   Respiratory: Positive for shortness of breath. Negative for cough and wheezing.    Musculoskeletal: Positive for stiffness. Negative for back pain and joint pain.   Gastrointestinal: Negative for anorexia, heartburn, melena, nausea and vomiting.   Genitourinary: Negative for dysuria, hematuria, hesitancy and nocturia.   Neurological: Negative for dizziness, light-headedness and loss of balance.   Psychiatric/Behavioral: Negative for depression and memory  "loss. The patient is not nervous/anxious.            Objective     /68 (BP Location: Left arm, Patient Position: Sitting)   Pulse 62   Ht 166.4 cm (65.51\")   Wt 79.9 kg (176 lb 3.2 oz)   BMI 28.86 kg/m²     Constitutional:       Appearance: Not in distress.   Eyes:      Pupils: Pupils are equal, round, and reactive to light.   HENT:      Nose: Nose normal.   Pulmonary:      Effort: Pulmonary effort is normal.      Breath sounds: Normal breath sounds.   Cardiovascular:      PMI at left midclavicular line. Normal rate. Regular rhythm.      Murmurs: There is a systolic murmur.   Abdominal:      Palpations: Abdomen is soft.   Musculoskeletal: Normal range of motion.      Cervical back: Normal range of motion and neck supple. Skin:     General: Skin is warm and dry.   Neurological:      Mental Status: Oriented to person, place and time.         Procedures    Assessment/Plan     CAD:  Hx of CABG. Most recent stress showed no ischemia. She will continue with DAPT therapy, bleeding denied. No new workup advised.    HTN:  Well managed on current. No adjustment to current toprol therapy recommended at present, BP low normal. Limited sodium advised. Adequate fluids advised. If BP drop lower at home, we will decrease metoprolol to every other day.     Hyperlipidemia:  On statin therapy with lipitor. She has tolerated well. FLP has not been rechecked. She will follow with you in regards.    Murmur/SOA:  Noted. No louder than prior. Most recent echo showed valve areas stable.     BMI noted at 28.86, good cardiac diet with limited carbs, calories, and activity as tolerated advised. Walking regimen encouraged to continue.     Refills per request.     6 month follow up recommended or sooner if needed.         Problems Addressed this Visit        Cardiac and Vasculature    CAD (coronary artery disease)    Relevant Medications    clopidogrel (PLAVIX) 75 MG tablet    metoprolol succinate XL (TOPROL-XL) 25 MG 24 hr tablet    " HTN (hypertension)    Relevant Medications    metoprolol succinate XL (TOPROL-XL) 25 MG 24 hr tablet    Hyperlipemia - Primary    Relevant Medications    atorvastatin (LIPITOR) 20 MG tablet    Hx of CABG    Relevant Medications    clopidogrel (PLAVIX) 75 MG tablet       Endocrine and Metabolic    Hypothyroidism    Relevant Medications    metoprolol succinate XL (TOPROL-XL) 25 MG 24 hr tablet      Other Visit Diagnoses     Heart murmur        Shortness of breath        Overweight          Diagnoses       Codes Comments    Mixed hyperlipidemia    -  Primary ICD-10-CM: E78.2  ICD-9-CM: 272.2     Coronary artery disease involving native coronary artery of native heart without angina pectoris     ICD-10-CM: I25.10  ICD-9-CM: 414.01     Hx of CABG     ICD-10-CM: Z95.1  ICD-9-CM: V45.81     Essential hypertension     ICD-10-CM: I10  ICD-9-CM: 401.9     Heart murmur     ICD-10-CM: R01.1  ICD-9-CM: 785.2     Shortness of breath     ICD-10-CM: R06.02  ICD-9-CM: 786.05     Acquired hypothyroidism     ICD-10-CM: E03.9  ICD-9-CM: 244.9     Overweight     ICD-10-CM: E66.3  ICD-9-CM: 278.02           Patient's Body mass index is 28.86 kg/m². indicating that she is overweight. Good cardiac diet with limited carbs, calories, and activity as tolerated advised.           Electronically signed by PRACHI Hernandez August 30, 2021 15:58 EDT

## 2021-12-14 ENCOUNTER — TELEPHONE (OUTPATIENT)
Dept: CARDIOLOGY | Facility: CLINIC | Age: 61
End: 2021-12-14

## 2021-12-14 NOTE — TELEPHONE ENCOUNTER
Received fax from Dr. De Los Santos for cardiac clearance for patient to have extractions of 5, 6, 7, 8, 9, 11, 20, 22, 27, 28, 29, 32. Patient is on aspirin and Plavix and they are requesting to hold. According to our records, patient's last stenting was done on 07/25/17. Patient had a CABG on 09/10/09.      Fax [phone]

## 2022-01-14 ENCOUNTER — TELEPHONE (OUTPATIENT)
Dept: CARDIOLOGY | Facility: CLINIC | Age: 62
End: 2022-01-14

## 2022-01-14 NOTE — TELEPHONE ENCOUNTER
"Pt would like to know what medication would be safe for her to take for mouth pain due to having teeth pulled.  She mentioned tylenol or \"something like tylenol\"  "

## 2022-03-09 ENCOUNTER — OFFICE VISIT (OUTPATIENT)
Dept: CARDIOLOGY | Facility: CLINIC | Age: 62
End: 2022-03-09

## 2022-03-09 VITALS
DIASTOLIC BLOOD PRESSURE: 86 MMHG | BODY MASS INDEX: 28.48 KG/M2 | WEIGHT: 177.2 LBS | HEART RATE: 64 BPM | SYSTOLIC BLOOD PRESSURE: 142 MMHG | HEIGHT: 66 IN

## 2022-03-09 DIAGNOSIS — I10 ESSENTIAL HYPERTENSION: ICD-10-CM

## 2022-03-09 DIAGNOSIS — E78.2 MIXED HYPERLIPIDEMIA: ICD-10-CM

## 2022-03-09 DIAGNOSIS — E66.3 OVERWEIGHT: ICD-10-CM

## 2022-03-09 DIAGNOSIS — I10 PRIMARY HYPERTENSION: ICD-10-CM

## 2022-03-09 DIAGNOSIS — R01.1 HEART MURMUR: ICD-10-CM

## 2022-03-09 DIAGNOSIS — E55.9 VITAMIN D DEFICIENCY: ICD-10-CM

## 2022-03-09 DIAGNOSIS — Z95.1 HX OF CABG: ICD-10-CM

## 2022-03-09 DIAGNOSIS — I25.10 CORONARY ARTERY DISEASE INVOLVING NATIVE CORONARY ARTERY OF NATIVE HEART WITHOUT ANGINA PECTORIS: Primary | ICD-10-CM

## 2022-03-09 PROCEDURE — 99214 OFFICE O/P EST MOD 30 MIN: CPT | Performed by: NURSE PRACTITIONER

## 2022-03-09 RX ORDER — ATORVASTATIN CALCIUM 20 MG/1
20 TABLET, FILM COATED ORAL DAILY
Qty: 90 TABLET | Refills: 2 | Status: SHIPPED | OUTPATIENT
Start: 2022-03-09 | End: 2022-03-14 | Stop reason: ALTCHOICE

## 2022-03-09 RX ORDER — NITROGLYCERIN 0.4 MG/1
0.4 TABLET SUBLINGUAL
Qty: 25 TABLET | Refills: 0 | Status: SHIPPED | OUTPATIENT
Start: 2022-03-09 | End: 2022-09-22 | Stop reason: SDUPTHER

## 2022-03-09 RX ORDER — METOPROLOL SUCCINATE 25 MG/1
TABLET, EXTENDED RELEASE ORAL
Qty: 90 TABLET | Refills: 2 | Status: SHIPPED | OUTPATIENT
Start: 2022-03-09 | End: 2022-09-01

## 2022-03-09 RX ORDER — CLOPIDOGREL BISULFATE 75 MG/1
75 TABLET ORAL DAILY
Qty: 90 TABLET | Refills: 2 | Status: SHIPPED | OUTPATIENT
Start: 2022-03-09 | End: 2022-09-01

## 2022-03-09 RX ORDER — PANTOPRAZOLE SODIUM 20 MG/1
20 TABLET, DELAYED RELEASE ORAL DAILY
Qty: 90 TABLET | Refills: 2 | Status: SHIPPED | OUTPATIENT
Start: 2022-03-09 | End: 2022-12-05 | Stop reason: SDUPTHER

## 2022-03-09 NOTE — PROGRESS NOTES
Chief Complaint   Patient presents with   • Follow-up     Pt is here for cardiac follow up.  She states she had a few CP a few months ago, but none since.  She denies SOB, dizziness or palpitations.  Pt does take a daily ASA.     • Med Refill     Pt request 90 day refills to be sent to [facility] in [place].   • Lab Work     Pt states her last labs were about 1 year ago.       Cardiac Complaints  none      Subjective   Amina Nath is a 62 y.o. female with hypertension, hypothyroidism, major depressive disorder, and ischemic heart disease. In 2009 she was noted to have severe three-vessel disease and also possible endocarditis involving the tricuspid valve. She underwent bypass surgery, tricuspid valve repair as well as PFO closure at that time. In 2014 she established care here. She had episodes of syncope and metoprolol was temporarily held. Stress test in 2016 for CP did not show any ischemia. She continued to have discomfort, so cardiac cath was done revealing significant disease of SVG to RCA and GUEVARA to ramus and had stents placed in each graft in July 2017. Stress test on 2/12/18 showed small inferoseptal ischemia with Imdur added and plan for cath for continued symptoms. At her July 2018 office visit she had not started Imdur. Ranexa was added as better option due to lower BP. At last visit, she reported more SOA, workup advised. Echo done 3/2021 showed valve areas stable and normal LV function. Stress testing done showed no ischemia and good LV function.     She comes today for follow up and reports doing well. No cardiac symptoms are reported. She denies any CP, SOA, palpitations, dizziness, or syncope. She does admit to some sharp chest pain a few months ago, but she reports it went away, and it has not returned. Patient has remained active without concerns. Las she admits have been done with PCP, but have not been checked for over a year, order requested. Refills requested for 90 day supply.  She does  admit to teeth being pulled since last visit, but admits to recovering well.           Cardiac History  Past Surgical History:   Procedure Laterality Date   • CARDIAC CATHETERIZATION  09/10/2009    Dr. HOLLIDAY 50% LM, 90% LAD, 80% LCX, 70% Ramus, 80% RCA.   • CARDIAC CATHETERIZATION  07/25/2017    100% SVG to RCA. 2.25x8 Promus to RCA. 95% GUEVARA to Ramus- 2.25x12 Promus   • CARDIOVASCULAR STRESS TEST  06/28/2012    5 min 88% THR. Negative   • CARDIOVASCULAR STRESS TEST  02/03/2014    L. Myoview- negative for ischemia   • CARDIOVASCULAR STRESS TEST  03/24/2016    Mod Elvis-9 min, 84% THR, 142/98, no definite ischemia, increase Metoprolol   • CARDIOVASCULAR STRESS TEST  10/25/2016    EF 65%, no ischemia   • CARDIOVASCULAR STRESS TEST  02/12/2018    L. Cardiolite- EF 65%. Small Inferoseptal Ischemia.   • CARDIOVASCULAR STRESS TEST  03/10/2021    Lexiscan- EF > 70%. Negative   • COLONOSCOPY  2021   • CONVERTED (HISTORICAL) HOLTER  08/11/2015    baseline sinus 66, no arrhythmias, rare PVC, PAC   • CORONARY ARTERY BYPASS GRAFT  09/10/2009    CROOK-LAD, GUEVARA-Ramus, seq SVG-Om1, OM2. SVG to OM3. SVG to PDA. PFO closure. TV repair   • ECHO - CONVERTED  09/10/2009    ([facility]) Dr. Lucero EF 55%. Anteroseptal WMA   • ECHO - CONVERTED  02/03/2014    EF 55-60%, mild MR, no pericardial effusion   • ECHO - CONVERTED  03/24/2016    EF 55-60%, mild MR, RVSP 19 mmHg, tachycardia   • ECHO - CONVERTED  10/25/2016    EF 55-60%, mild MR   • ECHO - CONVERTED  02/12/2018    EF 65%. No shunt   • ECHO - CONVERTED  03/10/2021    EF 65%. Trace-Mild MR   • ENDOSCOPY  2021   • OTHER SURGICAL HISTORY  07/14/2015    MRI brain- no evidence of CVA or hemorrhage   • US CAROTID UNILATERAL  07/22/2015    no sig stenosis       Current Outpatient Medications   Medication Sig Dispense Refill   • aspirin (aspirin) 81 MG EC tablet TAKE 1 TABLET BY MOUTH once DAILY 60 tablet 11   • atorvastatin (LIPITOR) 20 MG tablet Take 1 tablet by mouth Daily. 90 tablet 2   •  clopidogrel (PLAVIX) 75 MG tablet Take 1 tablet by mouth Daily. 90 tablet 2   • levothyroxine (SYNTHROID, LEVOTHROID) 75 MCG tablet Take 75 mcg by mouth Daily.     • metoclopramide (REGLAN) 10 MG tablet Take 10 mg by mouth 2 (Two) Times a Day.     • metoprolol succinate XL (TOPROL-XL) 25 MG 24 hr tablet 1/2 tab in AM and 1/2 tab PM 90 tablet 2   • nitroglycerin (NITROSTAT) 0.4 MG SL tablet Place 1 tablet under the tongue Every 5 (Five) Minutes As Needed for Chest Pain. do not exceed a total of 3 doses in 15 minutes 25 tablet 0   • oxybutynin XL (DITROPAN-XL) 10 MG 24 hr tablet Take 10 mg by mouth Daily.     • pantoprazole (PROTONIX) 20 MG EC tablet Take 1 tablet by mouth Daily. 90 tablet 2     No current facility-administered medications for this visit.       Patient has no known allergies.    Past Medical History:   Diagnosis Date   • Anxiety    • CAD (coronary artery disease)     s/p cabg   • Depression    • GERD (gastroesophageal reflux disease)    • H/O breast biopsy     benign   • History of colonoscopy    • History of esophagogastroduodenoscopy (EGD)    • Hypercholesterolemia    • Hyperlipidemia    • Hypertension    • Hypothyroidism    • Insomnia        Social History     Socioeconomic History   • Marital status: Single   Tobacco Use   • Smoking status: Former Smoker     Quit date:      Years since quittin.   • Smokeless tobacco: Never Used   • Tobacco comment: 1-2 ppd for 35 years   Vaping Use   • Vaping Use: Never used   Substance and Sexual Activity   • Alcohol use: No   • Drug use: No       Family History   Problem Relation Age of Onset   • Heart attack Father        Review of Systems   Constitutional: Negative for malaise/fatigue and night sweats.   HENT: Negative for ear pain, nosebleeds and tinnitus.    Cardiovascular: Negative for chest pain, claudication, dyspnea on exertion, irregular heartbeat, leg swelling, near-syncope, orthopnea, palpitations and syncope.   Respiratory: Negative  "for cough and shortness of breath.    Musculoskeletal: Positive for stiffness. Negative for back pain and joint pain.   Gastrointestinal: Negative for anorexia, heartburn, nausea and vomiting.   Genitourinary: Negative for dysuria, hematuria, hesitancy and nocturia.   Neurological: Negative for dizziness, headaches and light-headedness.   Psychiatric/Behavioral: Negative for depression and memory loss. The patient is not nervous/anxious.            Objective     /86 (BP Location: Left arm, Patient Position: Sitting)   Pulse 64   Ht 166.4 cm (65.5\")   Wt 80.4 kg (177 lb 3.2 oz)   BMI 29.04 kg/m²     Constitutional:       Appearance: Not in distress.   Eyes:      Pupils: Pupils are equal, round, and reactive to light.   HENT:      Nose: Nose normal.   Pulmonary:      Effort: Pulmonary effort is normal.      Breath sounds: Normal breath sounds.   Cardiovascular:      PMI at left midclavicular line. Normal rate. Regular rhythm.      Murmurs: There is a systolic murmur.   Abdominal:      Palpations: Abdomen is soft.   Musculoskeletal: Normal range of motion.      Cervical back: Normal range of motion and neck supple. Skin:     General: Skin is warm and dry.   Neurological:      Mental Status: Oriented to person, place and time.         Procedures    Assessment/Plan     CAD:  Hx of CABG. Most recent stress 2021 showed no ischemia. Status is stable, with cardiac symptoms denied, no new workup recommended. She will continue with DAPT therapy, bleeding denied.     HTN:  Slightly elevated today, metoprolol therapy to be increased back to whole tablet daily. She may take 1/2 in AM and 1/2 in PM. Limited sodium advised. Log encouraged the change. Adequate fluids advised.      Hyperlipidemia:  On statin therapy with lipitor. She has tolerated well. FLP has not been rechecked. Same continued. FLP order provided with more recommendations to follow.     Murmur/SOA:  Noted. No louder than prior. Most recent echo showed " valve areas stable.     BMI noted at 29.04, good cardiac diet with limited carbs, calories, and activity as tolerated advised. Walking regimen encouraged to continue.     Refills per request.     6 month follow up recommended or sooner if needed.           Problems Addressed this Visit        Cardiac and Vasculature    CAD (coronary artery disease) - Primary    Relevant Medications    metoprolol succinate XL (TOPROL-XL) 25 MG 24 hr tablet    clopidogrel (PLAVIX) 75 MG tablet    nitroglycerin (NITROSTAT) 0.4 MG SL tablet    Other Relevant Orders    CBC (No Diff)    Comprehensive Metabolic Panel    Lipid Panel    TSH    HTN (hypertension)    Relevant Medications    metoprolol succinate XL (TOPROL-XL) 25 MG 24 hr tablet    Other Relevant Orders    CBC (No Diff)    Comprehensive Metabolic Panel    Lipid Panel    TSH    Hyperlipemia    Relevant Medications    atorvastatin (LIPITOR) 20 MG tablet    Other Relevant Orders    CBC (No Diff)    Comprehensive Metabolic Panel    Lipid Panel    TSH    Hx of CABG    Relevant Medications    clopidogrel (PLAVIX) 75 MG tablet    Other Relevant Orders    CBC (No Diff)    Comprehensive Metabolic Panel    Lipid Panel    TSH      Other Visit Diagnoses     Heart murmur        Essential hypertension        Relevant Medications    metoprolol succinate XL (TOPROL-XL) 25 MG 24 hr tablet    Other Relevant Orders    CBC (No Diff)    Comprehensive Metabolic Panel    Lipid Panel    TSH    Vitamin D deficiency        Relevant Orders    Vitamin D 1,25 Dihydroxy    Overweight          Diagnoses       Codes Comments    Coronary artery disease involving native coronary artery of native heart without angina pectoris    -  Primary ICD-10-CM: I25.10  ICD-9-CM: 414.01     Hx of CABG     ICD-10-CM: Z95.1  ICD-9-CM: V45.81     Primary hypertension     ICD-10-CM: I10  ICD-9-CM: 401.9     Mixed hyperlipidemia     ICD-10-CM: E78.2  ICD-9-CM: 272.2     Heart murmur     ICD-10-CM: R01.1  ICD-9-CM: 785.2      Essential hypertension     ICD-10-CM: I10  ICD-9-CM: 401.9     Vitamin D deficiency     ICD-10-CM: E55.9  ICD-9-CM: 268.9     Overweight     ICD-10-CM: E66.3  ICD-9-CM: 278.02           Patient's Body mass index is 29.04 kg/m². indicating that she is overweight. Good cardiac diet with limited carbs, calories, and walking regimen advised.         Electronically signed by PRACHI Hernandez March 9, 2022 09:09 EST

## 2022-03-14 ENCOUNTER — TELEPHONE (OUTPATIENT)
Dept: CARDIOLOGY | Facility: CLINIC | Age: 62
End: 2022-03-14

## 2022-03-14 DIAGNOSIS — E78.5 HYPERLIPIDEMIA LDL GOAL <100: Primary | ICD-10-CM

## 2022-03-14 DIAGNOSIS — R74.8 ELEVATED LIVER ENZYMES: ICD-10-CM

## 2022-03-14 RX ORDER — ROSUVASTATIN CALCIUM 10 MG/1
10 TABLET, COATED ORAL DAILY
Qty: 90 TABLET | Refills: 3 | Status: SHIPPED | OUTPATIENT
Start: 2022-03-14 | End: 2022-09-22

## 2022-03-14 NOTE — PROGRESS NOTES
AST is higher at 69, but ALT is okay. LDL is high. Please have her to d/c the lipitor and change to crestor 10mg QHS. Have her labs rechecked in about 6 weeks. CMP and FLP. Order to be printed, she may .  Her Vit D level is good. It is above 50.

## 2022-03-14 NOTE — TELEPHONE ENCOUNTER
----- Message from PRACHI Parada sent at 3/14/2022  2:32 PM EDT -----  AST is higher at 69, but ALT is okay. LDL is high. Please have her to d/c the lipitor and change to crestor 10mg QHS. Have her labs rechecked in about 6 weeks. CMP and FLP. Order to be printed, she may .  Her Vit D level is good. It is above 5  0.

## 2022-06-07 DIAGNOSIS — I25.10 CORONARY ARTERY DISEASE INVOLVING NATIVE CORONARY ARTERY OF NATIVE HEART WITHOUT ANGINA PECTORIS: ICD-10-CM

## 2022-06-07 DIAGNOSIS — Z95.1 HX OF CABG: ICD-10-CM

## 2022-06-07 RX ORDER — ASPIRIN 81 MG/1
TABLET ORAL
Qty: 90 TABLET | Refills: 0 | Status: SHIPPED | OUTPATIENT
Start: 2022-06-07 | End: 2022-09-01

## 2022-09-01 DIAGNOSIS — I10 ESSENTIAL HYPERTENSION: ICD-10-CM

## 2022-09-01 DIAGNOSIS — Z95.1 HX OF CABG: ICD-10-CM

## 2022-09-01 DIAGNOSIS — I25.10 CORONARY ARTERY DISEASE INVOLVING NATIVE CORONARY ARTERY OF NATIVE HEART WITHOUT ANGINA PECTORIS: ICD-10-CM

## 2022-09-01 RX ORDER — ASPIRIN 81 MG/1
TABLET ORAL
Qty: 90 TABLET | Refills: 0 | Status: SHIPPED | OUTPATIENT
Start: 2022-09-01 | End: 2022-09-22 | Stop reason: SDUPTHER

## 2022-09-01 RX ORDER — CLOPIDOGREL BISULFATE 75 MG/1
TABLET ORAL
Qty: 90 TABLET | Refills: 0 | Status: SHIPPED | OUTPATIENT
Start: 2022-09-01 | End: 2022-09-22 | Stop reason: SDUPTHER

## 2022-09-01 RX ORDER — METOPROLOL SUCCINATE 25 MG/1
TABLET, EXTENDED RELEASE ORAL
Qty: 45 TABLET | Refills: 0 | Status: SHIPPED | OUTPATIENT
Start: 2022-09-01 | End: 2022-09-22 | Stop reason: SDUPTHER

## 2022-09-22 ENCOUNTER — OFFICE VISIT (OUTPATIENT)
Dept: CARDIOLOGY | Facility: CLINIC | Age: 62
End: 2022-09-22

## 2022-09-22 VITALS
HEART RATE: 64 BPM | BODY MASS INDEX: 29.15 KG/M2 | DIASTOLIC BLOOD PRESSURE: 82 MMHG | HEIGHT: 66 IN | SYSTOLIC BLOOD PRESSURE: 130 MMHG | WEIGHT: 181.4 LBS

## 2022-09-22 DIAGNOSIS — I10 ESSENTIAL HYPERTENSION: ICD-10-CM

## 2022-09-22 DIAGNOSIS — I25.10 CORONARY ARTERY DISEASE INVOLVING NATIVE CORONARY ARTERY OF NATIVE HEART WITHOUT ANGINA PECTORIS: Primary | ICD-10-CM

## 2022-09-22 DIAGNOSIS — E78.2 MIXED HYPERLIPIDEMIA: ICD-10-CM

## 2022-09-22 DIAGNOSIS — Z95.1 HX OF CABG: ICD-10-CM

## 2022-09-22 DIAGNOSIS — R74.8 ELEVATED LIVER ENZYMES: ICD-10-CM

## 2022-09-22 DIAGNOSIS — I10 PRIMARY HYPERTENSION: ICD-10-CM

## 2022-09-22 DIAGNOSIS — E78.5 HYPERLIPIDEMIA LDL GOAL <100: ICD-10-CM

## 2022-09-22 PROCEDURE — 99214 OFFICE O/P EST MOD 30 MIN: CPT | Performed by: NURSE PRACTITIONER

## 2022-09-22 RX ORDER — ATORVASTATIN CALCIUM 20 MG/1
20 TABLET, FILM COATED ORAL DAILY
Qty: 90 TABLET | Refills: 3 | Status: SHIPPED | OUTPATIENT
Start: 2022-09-22 | End: 2022-11-28

## 2022-09-22 RX ORDER — METOPROLOL SUCCINATE 25 MG/1
12.5 TABLET, EXTENDED RELEASE ORAL 2 TIMES DAILY
Qty: 90 TABLET | Refills: 3 | Status: SHIPPED | OUTPATIENT
Start: 2022-09-22

## 2022-09-22 RX ORDER — ATORVASTATIN CALCIUM 20 MG/1
20 TABLET, FILM COATED ORAL DAILY
COMMUNITY
End: 2022-09-22 | Stop reason: SDUPTHER

## 2022-09-22 RX ORDER — CLOPIDOGREL BISULFATE 75 MG/1
75 TABLET ORAL DAILY
Qty: 90 TABLET | Refills: 3 | Status: SHIPPED | OUTPATIENT
Start: 2022-09-22 | End: 2022-11-28

## 2022-09-22 RX ORDER — ASPIRIN 81 MG/1
TABLET ORAL
Qty: 90 TABLET | Refills: 3 | Status: SHIPPED | OUTPATIENT
Start: 2022-09-22 | End: 2022-12-05 | Stop reason: SDUPTHER

## 2022-09-22 RX ORDER — NITROGLYCERIN 0.4 MG/1
0.4 TABLET SUBLINGUAL
Qty: 25 TABLET | Refills: 3 | Status: SHIPPED | OUTPATIENT
Start: 2022-09-22 | End: 2023-03-20 | Stop reason: SDUPTHER

## 2022-09-28 NOTE — PROGRESS NOTES
Chief Complaint   Patient presents with   • Follow-up     Cardiac management   • Lab     Last labs in chart.    • Chest Pain     Has had 2 sharp pains in left chest since last visit, short in duration. She reports pain did not alarm her.   • Med Refill     Needs refills on cardiac medications including Nitro sl-90 day.     Subjective       Amina Nath is a 62 y.o. female with HTN, hypothyroidism, major depressive disorder, and IHD diagnosed in 2009 when she was found to have severe three-vessel disease and also possible endocarditis involving the tricuspid valve. She underwent bypass surgery, tricuspid valve repair as well as PFO closure at that time. In 2014 she established care here.  Cardiac cath in 2017 revealed significant disease of SVG to RCA and GUEVARA to ramus and had stents placed in each graft in July 2017.  Her last stress test in March 2021 showed no ischemia and normal LV function.      She returns today for regular follow-up visit.  She is concerned about recurrent chest pain.  Since her last visit, she has noticed to significant episodes of discomfort requiring nitroglycerin.  Pain is substernal to left chest, radiates into her left arm.  She has mild dyspnea on exertion.  Denies dizziness, palpitations, syncope.  Labs March 2022 showed , triglycerides 231, , HDL 41.  AST 76/alkaline phosphatase 84.  Lipitor changed to Crestor.  When she brought in medication list today, it appears she is still on Lipitor.       Cardiac History:    Past Surgical History:   Procedure Laterality Date   • CARDIAC CATHETERIZATION  09/10/2009    Dr. HOLLIDAY 50% LM, 90% LAD, 80% LCX, 70% Ramus, 80% RCA.   • CARDIAC CATHETERIZATION  07/25/2017    100% SVG to RCA. 2.25x8 Promus to RCA. 95% GUEVARA to Ramus- 2.25x12 Promus   • CARDIOVASCULAR STRESS TEST  06/28/2012    5 min 88% THR. Negative   • CARDIOVASCULAR STRESS TEST  02/03/2014    L. Myoview- negative for ischemia   • CARDIOVASCULAR STRESS TEST  03/24/2016    Mod  Elvis-9 min, 84% THR, 142/98, no definite ischemia, increase Metoprolol   • CARDIOVASCULAR STRESS TEST  10/25/2016    EF 65%, no ischemia   • CARDIOVASCULAR STRESS TEST  02/12/2018    L. Cardiolite- EF 65%. Small Inferoseptal Ischemia.   • CARDIOVASCULAR STRESS TEST  03/10/2021    Lexiscan- EF > 70%. Negative   • COLONOSCOPY  2021   • CONVERTED (HISTORICAL) HOLTER  08/11/2015    baseline sinus 66, no arrhythmias, rare PVC, PAC   • CORONARY ARTERY BYPASS GRAFT  09/10/2009    CROOK-LAD, GUEVARA-Ramus, seq SVG-Om1, OM2. SVG to OM3. SVG to PDA. PFO closure. TV repair   • ECHO - CONVERTED  09/10/2009    ([facility]) Dr. Lucero EF 55%. Anteroseptal WMA   • ECHO - CONVERTED  02/03/2014    EF 55-60%, mild MR, no pericardial effusion   • ECHO - CONVERTED  03/24/2016    EF 55-60%, mild MR, RVSP 19 mmHg, tachycardia   • ECHO - CONVERTED  10/25/2016    EF 55-60%, mild MR   • ECHO - CONVERTED  02/12/2018    EF 65%. No shunt   • ECHO - CONVERTED  03/10/2021    EF 65%. Trace-Mild MR   • ENDOSCOPY  2021   • OTHER SURGICAL HISTORY  07/14/2015    MRI brain- no evidence of CVA or hemorrhage   • US CAROTID UNILATERAL  07/22/2015    no sig stenosis     Current Outpatient Medications   Medication Sig Dispense Refill   • aspirin (EQ Aspirin Adult Low Dose) 81 MG EC tablet Take 1 tablet by mouth once daily 90 tablet 3   • atorvastatin (LIPITOR) 20 MG tablet Take 1 tablet by mouth Daily. 90 tablet 3   • clopidogrel (PLAVIX) 75 MG tablet Take 1 tablet by mouth Daily. 90 tablet 3   • levothyroxine (SYNTHROID, LEVOTHROID) 75 MCG tablet Take 75 mcg by mouth Daily.     • metoclopramide (REGLAN) 10 MG tablet Take 10 mg by mouth 2 (Two) Times a Day.     • metoprolol succinate XL (TOPROL-XL) 25 MG 24 hr tablet Take 0.5 tablets by mouth 2 (Two) Times a Day. 90 tablet 3   • nitroglycerin (NITROSTAT) 0.4 MG SL tablet Place 1 tablet under the tongue Every 5 (Five) Minutes As Needed for Chest Pain. do not exceed a total of 3 doses in 15 minutes 25 tablet 3   •  oxybutynin XL (DITROPAN-XL) 10 MG 24 hr tablet Take 10 mg by mouth Daily.     • pantoprazole (PROTONIX) 20 MG EC tablet Take 1 tablet by mouth Daily. 90 tablet 2     No current facility-administered medications for this visit.     Patient has no known allergies.    Past Medical History:   Diagnosis Date   • Anxiety    • CAD (coronary artery disease)     s/p cabg   • Depression    • GERD (gastroesophageal reflux disease)    • H/O breast biopsy     benign   • History of colonoscopy    • History of esophagogastroduodenoscopy (EGD)    • Hypercholesterolemia    • Hyperlipidemia    • Hypertension    • Hypothyroidism    • Insomnia      Social History     Socioeconomic History   • Marital status: Single   Tobacco Use   • Smoking status: Former Smoker     Packs/day: 1.50     Years: 36.00     Pack years: 54.00     Quit date:      Years since quittin.7   • Smokeless tobacco: Never Used   Vaping Use   • Vaping Use: Never used   Substance and Sexual Activity   • Alcohol use: No   • Drug use: No   • Sexual activity: Defer     Family History   Problem Relation Age of Onset   • Heart attack Father      Review of Systems   Constitutional: Positive for weight gain (4). Negative for decreased appetite and malaise/fatigue.   HENT: Negative.    Eyes: Negative for blurred vision.   Cardiovascular: Positive for chest pain and dyspnea on exertion. Negative for leg swelling, palpitations and syncope.   Respiratory: Negative for shortness of breath and sleep disturbances due to breathing.    Endocrine: Negative.    Hematologic/Lymphatic: Negative for bleeding problem. Does not bruise/bleed easily.   Skin: Negative.    Musculoskeletal: Negative for falls and myalgias.   Gastrointestinal: Negative for abdominal pain, heartburn and melena.   Genitourinary: Negative for hematuria.   Neurological: Negative for dizziness and light-headedness.   Psychiatric/Behavioral: Negative for altered mental status.   Allergic/Immunologic:  "Negative.       Objective     /82 (BP Location: Left arm)   Pulse 64   Ht 166.4 cm (65.51\")   Wt 82.3 kg (181 lb 6.4 oz)   BMI 29.72 kg/m²     Vitals and nursing note reviewed.   Constitutional:       General: Not in acute distress.     Appearance: Well-developed. Not diaphoretic.   Eyes:      Pupils: Pupils are equal, round, and reactive to light.   HENT:      Head: Normocephalic.   Pulmonary:      Effort: Pulmonary effort is normal. No respiratory distress.      Breath sounds: Normal breath sounds.   Cardiovascular:      Normal rate. Regular rhythm.   Pulses:     Intact distal pulses.   Edema:     Peripheral edema absent.   Abdominal:      General: Bowel sounds are normal.      Palpations: Abdomen is soft.   Musculoskeletal: Normal range of motion.      Cervical back: Normal range of motion. Skin:     General: Skin is warm and dry.   Neurological:      Mental Status: Alert and oriented to person, place, and time.        Procedures          Problem List Items Addressed This Visit        Cardiac and Vasculature    CAD (coronary artery disease) - Primary    Relevant Medications    aspirin (EQ Aspirin Adult Low Dose) 81 MG EC tablet    clopidogrel (PLAVIX) 75 MG tablet    metoprolol succinate XL (TOPROL-XL) 25 MG 24 hr tablet    nitroglycerin (NITROSTAT) 0.4 MG SL tablet    Other Relevant Orders    Stress Test With Myocardial Perfusion One Day    Adult Transthoracic Echo Complete W/ Cont if Necessary Per Protocol    Lipid Panel    TSH    CBC & Differential    Comprehensive Metabolic Panel    HTN (hypertension)    Relevant Medications    metoprolol succinate XL (TOPROL-XL) 25 MG 24 hr tablet    Hyperlipemia    Relevant Medications    atorvastatin (LIPITOR) 20 MG tablet    Other Relevant Orders    Stress Test With Myocardial Perfusion One Day    Adult Transthoracic Echo Complete W/ Cont if Necessary Per Protocol    Hx of CABG    Relevant Medications    aspirin (EQ Aspirin Adult Low Dose) 81 MG EC tablet    " clopidogrel (PLAVIX) 75 MG tablet    Other Relevant Orders    Stress Test With Myocardial Perfusion One Day    Adult Transthoracic Echo Complete W/ Cont if Necessary Per Protocol    Lipid Panel      Other Visit Diagnoses     Essential hypertension        Relevant Medications    metoprolol succinate XL (TOPROL-XL) 25 MG 24 hr tablet    Other Relevant Orders    Stress Test With Myocardial Perfusion One Day    Adult Transthoracic Echo Complete W/ Cont if Necessary Per Protocol    Lipid Panel    TSH    CBC & Differential    Comprehensive Metabolic Panel    Hyperlipidemia LDL goal <100        Relevant Medications    atorvastatin (LIPITOR) 20 MG tablet    Other Relevant Orders    Stress Test With Myocardial Perfusion One Day    Adult Transthoracic Echo Complete W/ Cont if Necessary Per Protocol    Elevated liver enzymes        Relevant Orders    Stress Test With Myocardial Perfusion One Day    Adult Transthoracic Echo Complete W/ Cont if Necessary Per Protocol    Lipid Panel    Comprehensive Metabolic Panel        1.  CAD/valvular heart disease-s/p CABG followed by stenting with recurrent anginal chest pain.  Recommend repeating nuclear stress test and echocardiogram to evaluate LVEF, coronary artery perfusion.  Echocardiogram to evaluate tricuspid valve, mitral valve, PA pressure.  Continue DAPT, statin.    2.  Hypercholesterolemia-lipids historically significantly elevated with mildly elevated LFT.  Lipitor changed to Crestor.  We will repeat lipid panel and LFT when she presents for stress test.    3.  HTN- well-controlled at 130/82 with metoprolol.  Continue half tablet twice daily.    4.  Tricuspid valve repair will be evaluated by echocardiogram.    Further recommendations to follow stress, echo, labs.  Follow-up in 6 months or sooner as needed.    BMI is >= 25 and <30. (Overweight) The following options were offered after discussion;: nutrition counseling/recommendations               Electronically signed by Renetta  VY Santos, APRN,  September 28, 2022 08:22 EDT

## 2022-11-08 ENCOUNTER — TELEPHONE (OUTPATIENT)
Dept: CARDIOLOGY | Facility: CLINIC | Age: 62
End: 2022-11-08

## 2022-11-08 NOTE — TELEPHONE ENCOUNTER
Caller: Amina Nath    Relationship: Self    Best call back number: [phone]    What is the best time to reach you: ANYTIME    Who are you requesting to speak with (clinical staff, provider,  specific staff member): CLINICAL    What was the call regarding: PT HAS A STRESS TEST TOMORROW 11.09.2022 AND KNOWS TO NPO AFTER MIDNIGHT BUT WANTED TO SEE IF SHE IS STILL ABLE TO TAKE HER MEDICINES IN THE MORNING OR IF SHE NEEDS TO WAIT UNTIL AFTER THE TEST    Do you require a callback: YES PLEASE

## 2022-11-08 NOTE — TELEPHONE ENCOUNTER
I spoke with patient and answered her questions.  Patient advised she can take her AM medications.  She is not on Imdur.

## 2022-11-09 ENCOUNTER — HOSPITAL ENCOUNTER (OUTPATIENT)
Dept: CARDIOLOGY | Facility: HOSPITAL | Age: 62
Discharge: HOME | End: 2022-11-09

## 2022-11-09 ENCOUNTER — APPOINTMENT (OUTPATIENT)
Dept: CARDIOLOGY | Facility: HOSPITAL | Age: 62
End: 2022-11-09

## 2022-11-09 ENCOUNTER — LAB (OUTPATIENT)
Dept: LAB | Facility: HOSPITAL | Age: 62
End: 2022-11-09

## 2022-11-09 DIAGNOSIS — I25.10 CORONARY ARTERY DISEASE INVOLVING NATIVE CORONARY ARTERY OF NATIVE HEART WITHOUT ANGINA PECTORIS: ICD-10-CM

## 2022-11-09 DIAGNOSIS — Z95.1 HX OF CABG: ICD-10-CM

## 2022-11-09 DIAGNOSIS — R74.8 ELEVATED LIVER ENZYMES: ICD-10-CM

## 2022-11-09 DIAGNOSIS — I10 ESSENTIAL HYPERTENSION: ICD-10-CM

## 2022-11-09 DIAGNOSIS — E78.5 HYPERLIPIDEMIA LDL GOAL <100: ICD-10-CM

## 2022-11-09 DIAGNOSIS — E78.2 MIXED HYPERLIPIDEMIA: ICD-10-CM

## 2022-11-09 LAB
ALBUMIN SERPL-MCNC: 4.01 G/DL (ref 3.5–5.2)
ALBUMIN/GLOB SERPL: 1.3 G/DL
ALP SERPL-CCNC: 115 U/L (ref 39–117)
ALT SERPL W P-5'-P-CCNC: 20 U/L (ref 1–33)
ANION GAP SERPL CALCULATED.3IONS-SCNC: 16.3 MMOL/L (ref 5–15)
AST SERPL-CCNC: 48 U/L (ref 1–32)
BASOPHILS # BLD AUTO: 0.02 10*3/MM3 (ref 0–0.2)
BASOPHILS NFR BLD AUTO: 0.3 % (ref 0–1.5)
BH CV ECHO MEAS - ACS: 1.77 CM
BH CV ECHO MEAS - AO MAX PG: 7.2 MMHG
BH CV ECHO MEAS - AO MEAN PG: 3.8 MMHG
BH CV ECHO MEAS - AO ROOT DIAM: 2.8 CM
BH CV ECHO MEAS - AO V2 MAX: 134.4 CM/SEC
BH CV ECHO MEAS - AO V2 VTI: 29.7 CM
BH CV ECHO MEAS - EDV(CUBED): 85.2 ML
BH CV ECHO MEAS - EDV(MOD-SP4): 53.1 ML
BH CV ECHO MEAS - EF(MOD-SP4): 64.6 %
BH CV ECHO MEAS - EF_3D-VOL: 59 %
BH CV ECHO MEAS - ESV(CUBED): 12 ML
BH CV ECHO MEAS - ESV(MOD-SP4): 18.8 ML
BH CV ECHO MEAS - FS: 48 %
BH CV ECHO MEAS - IVS/LVPW: 0.67 CM
BH CV ECHO MEAS - IVSD: 0.86 CM
BH CV ECHO MEAS - LA DIMENSION: 3.7 CM
BH CV ECHO MEAS - LAT PEAK E' VEL: 6.9 CM/SEC
BH CV ECHO MEAS - LV DIASTOLIC VOL/BSA (35-75): 28 CM2
BH CV ECHO MEAS - LV MASS(C)D: 162.7 GRAMS
BH CV ECHO MEAS - LV SYSTOLIC VOL/BSA (12-30): 9.9 CM2
BH CV ECHO MEAS - LVIDD: 4.4 CM
BH CV ECHO MEAS - LVIDS: 2.29 CM
BH CV ECHO MEAS - LVOT AREA: 2.8 CM2
BH CV ECHO MEAS - LVOT DIAM: 1.88 CM
BH CV ECHO MEAS - LVPWD: 1.28 CM
BH CV ECHO MEAS - MED PEAK E' VEL: 5.1 CM/SEC
BH CV ECHO MEAS - MV A MAX VEL: 57 CM/SEC
BH CV ECHO MEAS - MV DEC SLOPE: 174.7 CM/SEC2
BH CV ECHO MEAS - MV E MAX VEL: 46.7 CM/SEC
BH CV ECHO MEAS - MV E/A: 0.82
BH CV ECHO MEAS - RVDD: 2.6 CM
BH CV ECHO MEAS - SI(MOD-SP4): 18.1 ML/M2
BH CV ECHO MEAS - SV(MOD-SP4): 34.3 ML
BH CV ECHO MEASUREMENTS AVERAGE E/E' RATIO: 7.78
BH CV REST NUCLEAR ISOTOPE DOSE: 10 MCI
BH CV STRESS COMMENTS STAGE 1: NORMAL
BH CV STRESS DOSE REGADENOSON STAGE 1: 0.4
BH CV STRESS DURATION MIN STAGE 1: 0
BH CV STRESS DURATION SEC STAGE 1: 10
BH CV STRESS NUCLEAR ISOTOPE DOSE: 30 MCI
BH CV STRESS PROTOCOL 1: NORMAL
BH CV STRESS RECOVERY HR: 93 BPM
BH CV STRESS STAGE 1: 1
BILIRUB SERPL-MCNC: 0.7 MG/DL (ref 0–1.2)
BUN SERPL-MCNC: 8 MG/DL (ref 8–23)
BUN/CREAT SERPL: 9 (ref 7–25)
CALCIUM SPEC-SCNC: 9.4 MG/DL (ref 8.6–10.5)
CHLORIDE SERPL-SCNC: 101 MMOL/L (ref 98–107)
CHOLEST SERPL-MCNC: 157 MG/DL (ref 0–200)
CO2 SERPL-SCNC: 19.7 MMOL/L (ref 22–29)
CREAT SERPL-MCNC: 0.89 MG/DL (ref 0.57–1)
DEPRECATED RDW RBC AUTO: 42 FL (ref 37–54)
EGFRCR SERPLBLD CKD-EPI 2021: 73.9 ML/MIN/1.73
EOSINOPHIL # BLD AUTO: 0.08 10*3/MM3 (ref 0–0.4)
EOSINOPHIL NFR BLD AUTO: 1.2 % (ref 0.3–6.2)
ERYTHROCYTE [DISTWIDTH] IN BLOOD BY AUTOMATED COUNT: 12 % (ref 12.3–15.4)
GLOBULIN UR ELPH-MCNC: 3 GM/DL
GLUCOSE SERPL-MCNC: 87 MG/DL (ref 65–99)
HCT VFR BLD AUTO: 41.5 % (ref 34–46.6)
HDLC SERPL-MCNC: 42 MG/DL (ref 40–60)
HGB BLD-MCNC: 14.1 G/DL (ref 12–15.9)
IMM GRANULOCYTES # BLD AUTO: 0.01 10*3/MM3 (ref 0–0.05)
IMM GRANULOCYTES NFR BLD AUTO: 0.2 % (ref 0–0.5)
LDLC SERPL CALC-MCNC: 92 MG/DL (ref 0–100)
LDLC/HDLC SERPL: 2.14 {RATIO}
LEFT ATRIUM VOLUME INDEX: 18.7 ML/M2
LV EF NUC BP: 77 %
LYMPHOCYTES # BLD AUTO: 1.66 10*3/MM3 (ref 0.7–3.1)
LYMPHOCYTES NFR BLD AUTO: 25.2 % (ref 19.6–45.3)
MAXIMAL PREDICTED HEART RATE: 159 BPM
MAXIMAL PREDICTED HEART RATE: 159 BPM
MCH RBC QN AUTO: 32.6 PG (ref 26.6–33)
MCHC RBC AUTO-ENTMCNC: 34 G/DL (ref 31.5–35.7)
MCV RBC AUTO: 95.8 FL (ref 79–97)
MONOCYTES # BLD AUTO: 0.46 10*3/MM3 (ref 0.1–0.9)
MONOCYTES NFR BLD AUTO: 7 % (ref 5–12)
NEUTROPHILS NFR BLD AUTO: 4.35 10*3/MM3 (ref 1.7–7)
NEUTROPHILS NFR BLD AUTO: 66.1 % (ref 42.7–76)
NRBC BLD AUTO-RTO: 0 /100 WBC (ref 0–0.2)
PERCENT MAX PREDICTED HR: 71.07 %
PLATELET # BLD AUTO: 218 10*3/MM3 (ref 140–450)
PMV BLD AUTO: 10.6 FL (ref 6–12)
POTASSIUM SERPL-SCNC: 4.4 MMOL/L (ref 3.5–5.2)
PROT SERPL-MCNC: 7 G/DL (ref 6–8.5)
RBC # BLD AUTO: 4.33 10*6/MM3 (ref 3.77–5.28)
SODIUM SERPL-SCNC: 137 MMOL/L (ref 136–145)
STRESS BASELINE BP: NORMAL MMHG
STRESS BASELINE HR: 71 BPM
STRESS PERCENT HR: 84 %
STRESS POST PEAK BP: NORMAL MMHG
STRESS POST PEAK HR: 113 BPM
STRESS TARGET HR: 135 BPM
STRESS TARGET HR: 135 BPM
TRIGL SERPL-MCNC: 126 MG/DL (ref 0–150)
TSH SERPL DL<=0.05 MIU/L-ACNC: 1.97 UIU/ML (ref 0.27–4.2)
VLDLC SERPL-MCNC: 23 MG/DL (ref 5–40)
WBC NRBC COR # BLD: 6.58 10*3/MM3 (ref 3.4–10.8)

## 2022-11-09 PROCEDURE — A9500 TC99M SESTAMIBI: HCPCS | Performed by: INTERNAL MEDICINE

## 2022-11-09 PROCEDURE — 78452 HT MUSCLE IMAGE SPECT MULT: CPT

## 2022-11-09 PROCEDURE — 0 TECHNETIUM SESTAMIBI: Performed by: INTERNAL MEDICINE

## 2022-11-09 PROCEDURE — 93306 TTE W/DOPPLER COMPLETE: CPT

## 2022-11-09 PROCEDURE — 78452 HT MUSCLE IMAGE SPECT MULT: CPT | Performed by: INTERNAL MEDICINE

## 2022-11-09 PROCEDURE — 25010000002 REGADENOSON 0.4 MG/5ML SOLUTION: Performed by: NURSE PRACTITIONER

## 2022-11-09 PROCEDURE — 93306 TTE W/DOPPLER COMPLETE: CPT | Performed by: INTERNAL MEDICINE

## 2022-11-09 PROCEDURE — 80050 GENERAL HEALTH PANEL: CPT | Performed by: NURSE PRACTITIONER

## 2022-11-09 PROCEDURE — 93018 CV STRESS TEST I&R ONLY: CPT | Performed by: INTERNAL MEDICINE

## 2022-11-09 PROCEDURE — 93017 CV STRESS TEST TRACING ONLY: CPT

## 2022-11-09 PROCEDURE — 80061 LIPID PANEL: CPT | Performed by: NURSE PRACTITIONER

## 2022-11-09 RX ADMIN — TECHNETIUM TC 99M SESTAMIBI 1 DOSE: 1 INJECTION INTRAVENOUS at 10:52

## 2022-11-09 RX ADMIN — TECHNETIUM TC 99M SESTAMIBI 1 DOSE: 1 INJECTION INTRAVENOUS at 15:16

## 2022-11-09 RX ADMIN — REGADENOSON 0.4 MG: 0.08 INJECTION, SOLUTION INTRAVENOUS at 13:46

## 2022-11-10 RX ORDER — ISOSORBIDE MONONITRATE 30 MG/1
30 TABLET, EXTENDED RELEASE ORAL EVERY MORNING
Qty: 30 TABLET | Refills: 11 | Status: SHIPPED | OUTPATIENT
Start: 2022-11-10 | End: 2023-03-20 | Stop reason: SINTOL

## 2022-11-26 DIAGNOSIS — I25.10 CORONARY ARTERY DISEASE INVOLVING NATIVE CORONARY ARTERY OF NATIVE HEART WITHOUT ANGINA PECTORIS: ICD-10-CM

## 2022-11-26 DIAGNOSIS — Z95.1 HX OF CABG: ICD-10-CM

## 2022-11-28 RX ORDER — ATORVASTATIN CALCIUM 20 MG/1
TABLET, FILM COATED ORAL
Qty: 90 TABLET | Refills: 0 | Status: SHIPPED | OUTPATIENT
Start: 2022-11-28 | End: 2023-03-20 | Stop reason: SDUPTHER

## 2022-11-28 RX ORDER — CLOPIDOGREL BISULFATE 75 MG/1
TABLET ORAL
Qty: 90 TABLET | Refills: 0 | Status: SHIPPED | OUTPATIENT
Start: 2022-11-28 | End: 2022-12-05 | Stop reason: SDUPTHER

## 2022-11-30 ENCOUNTER — TELEPHONE (OUTPATIENT)
Dept: CARDIOLOGY | Facility: CLINIC | Age: 62
End: 2022-11-30

## 2022-11-30 NOTE — TELEPHONE ENCOUNTER
Caller: NILAY     Relationship to patient: SELF    Best call back number: [phone]    Patient is needing: PT IS GETTING SICK AND WANTS TO VERIFY WITH HER BLOOD PRESSURE AND MEDICATION IF SHE CAN TAKE COUGH MEDICINE AND COUGH DROPS.     PATIENT WOULD LIKE TO KNOW TODAY SO MARKING AS URGENT.     THANK YOU.

## 2022-12-05 DIAGNOSIS — Z95.1 HX OF CABG: ICD-10-CM

## 2022-12-05 DIAGNOSIS — I25.10 CORONARY ARTERY DISEASE INVOLVING NATIVE CORONARY ARTERY OF NATIVE HEART WITHOUT ANGINA PECTORIS: ICD-10-CM

## 2022-12-05 RX ORDER — ASPIRIN 81 MG/1
TABLET ORAL
Qty: 90 TABLET | Refills: 3 | Status: SHIPPED | OUTPATIENT
Start: 2022-12-05

## 2022-12-05 RX ORDER — CLOPIDOGREL BISULFATE 75 MG/1
75 TABLET ORAL DAILY
Qty: 90 TABLET | Refills: 1 | Status: SHIPPED | OUTPATIENT
Start: 2022-12-05

## 2022-12-05 RX ORDER — PANTOPRAZOLE SODIUM 20 MG/1
20 TABLET, DELAYED RELEASE ORAL DAILY
Qty: 90 TABLET | Refills: 2 | Status: SHIPPED | OUTPATIENT
Start: 2022-12-05

## 2022-12-05 NOTE — TELEPHONE ENCOUNTER
Caller: Amina Nath    Relationship: Self    Best call back number: [phone]    Requested Prescriptions:   Requested Prescriptions     Pending Prescriptions Disp Refills   • pantoprazole (PROTONIX) 20 MG EC tablet 90 tablet 2     Sig: Take 1 tablet by mouth Daily.   • aspirin (EQ Aspirin Adult Low Dose) 81 MG EC tablet 90 tablet 3     Sig: Take 1 tablet by mouth once daily   • clopidogrel (PLAVIX) 75 MG tablet 90 tablet 0     Sig: Take 1 tablet by mouth Daily.        Pharmacy where request should be sent: [address] [phone] [facility] [phone] [facility]     Additional details provided by patient: 1 WEEK REMAINING     Does the patient have less than a 3 day supply:  [] Yes  [x] No    Would you like a call back once the refill request has been completed: [x] Yes [] No    If the office needs to give you a call back, can they leave a voicemail: [x] Yes [] No    Krystian Chester Rep   12/05/22 08:28 EST

## 2023-03-20 ENCOUNTER — OFFICE VISIT (OUTPATIENT)
Dept: CARDIOLOGY | Facility: CLINIC | Age: 63
End: 2023-03-20
Payer: COMMERCIAL

## 2023-03-20 VITALS
SYSTOLIC BLOOD PRESSURE: 122 MMHG | DIASTOLIC BLOOD PRESSURE: 80 MMHG | WEIGHT: 177.4 LBS | HEART RATE: 60 BPM | HEIGHT: 66 IN | BODY MASS INDEX: 28.51 KG/M2

## 2023-03-20 DIAGNOSIS — E78.2 MIXED HYPERLIPIDEMIA: ICD-10-CM

## 2023-03-20 DIAGNOSIS — Z95.1 HX OF CABG: ICD-10-CM

## 2023-03-20 DIAGNOSIS — Z98.890 HISTORY OF TRICUSPID VALVE REPAIR: ICD-10-CM

## 2023-03-20 DIAGNOSIS — I25.10 CORONARY ARTERY DISEASE INVOLVING NATIVE CORONARY ARTERY OF NATIVE HEART WITHOUT ANGINA PECTORIS: Primary | ICD-10-CM

## 2023-03-20 DIAGNOSIS — K21.9 GASTROESOPHAGEAL REFLUX DISEASE WITHOUT ESOPHAGITIS: ICD-10-CM

## 2023-03-20 DIAGNOSIS — I10 PRIMARY HYPERTENSION: ICD-10-CM

## 2023-03-20 PROCEDURE — 1160F RVW MEDS BY RX/DR IN RCRD: CPT | Performed by: NURSE PRACTITIONER

## 2023-03-20 PROCEDURE — 3079F DIAST BP 80-89 MM HG: CPT | Performed by: NURSE PRACTITIONER

## 2023-03-20 PROCEDURE — 3074F SYST BP LT 130 MM HG: CPT | Performed by: NURSE PRACTITIONER

## 2023-03-20 PROCEDURE — 99214 OFFICE O/P EST MOD 30 MIN: CPT | Performed by: NURSE PRACTITIONER

## 2023-03-20 PROCEDURE — 1159F MED LIST DOCD IN RCRD: CPT | Performed by: NURSE PRACTITIONER

## 2023-03-20 RX ORDER — NITROGLYCERIN 0.4 MG/1
0.4 TABLET SUBLINGUAL
Qty: 25 TABLET | Refills: 3 | Status: SHIPPED | OUTPATIENT
Start: 2023-03-20

## 2023-03-20 RX ORDER — ATORVASTATIN CALCIUM 20 MG/1
20 TABLET, FILM COATED ORAL DAILY
Qty: 90 TABLET | Refills: 3 | Status: SHIPPED | OUTPATIENT
Start: 2023-03-20

## 2023-03-20 RX ORDER — ATORVASTATIN CALCIUM 20 MG/1
20 TABLET, FILM COATED ORAL DAILY
Qty: 90 TABLET | Refills: 3 | Status: CANCELLED | OUTPATIENT
Start: 2023-03-20

## 2023-03-20 NOTE — PROGRESS NOTES
Chief Complaint   Patient presents with   • Follow-up     Cardiac management. Has no cardiac complaints today.   • LABS     Current labs November 2022 on chart   • Med Refill     Needs refills on Atorvastatin 90 day supply to [facility]        Subjective       Amina Nath is a 63 y.o. female  with HTN, hypothyroidism, major depressive disorder, and IHD diagnosed in 2009 when she was found to have severe three-vessel disease and also possible endocarditis involving the tricuspid valve. She underwent bypass surgery, tricuspid valve repair as well as PFO closure at that time. In 2014 she established care here.  Cardiac cath in 2017 revealed significant disease of SVG to RCA and GUEVARA to ramus and had stents placed in each graft in July 2017.  Her last stress test in March 2021 showed no ischemia and normal LV function.      On 11/9/2022 Lexiscan stress test showed small inferior lateral wall ischemia.  Recommendation was medical management if symptoms persist then elective cardiac catheterization.    Today she returns to the office for a follow up visit. She denies chest pain, palpitations or dizziness.  With exertion she has shortness of breath but denies being a worsening symptom.  She would did not tolerate Imdur and has not had to use Nitrostat.    Cardiac History:    Past Surgical History:   Procedure Laterality Date   • CARDIAC CATHETERIZATION  09/10/2009    Dr. HOLLIDAY 50% LM, 90% LAD, 80% LCX, 70% Ramus, 80% RCA.   • CARDIAC CATHETERIZATION  07/25/2017    100% SVG to RCA. 2.25x8 Promus to RCA. 95% GUEVARA to Ramus- 2.25x12 Promus   • CARDIOVASCULAR STRESS TEST  06/28/2012    5 min 88% THR. Negative   • CARDIOVASCULAR STRESS TEST  02/03/2014    L. Myoview- negative for ischemia   • CARDIOVASCULAR STRESS TEST  03/24/2016    Mod Elvis-9 min, 84% THR, 142/98, no definite ischemia, increase Metoprolol   • CARDIOVASCULAR STRESS TEST  10/25/2016    EF 65%, no ischemia   • CARDIOVASCULAR STRESS TEST  02/12/2018    L.  Cardiolite- EF 65%. Small Inferoseptal Ischemia.   • CARDIOVASCULAR STRESS TEST  03/10/2021    Lexiscan- EF > 70%. Negative   • CARDIOVASCULAR STRESS TEST  11/09/2022    Lexiscan- EF > 70%. Small Inferolateral Ischemia   • COLONOSCOPY  2021   • CONVERTED (HISTORICAL) HOLTER  08/11/2015    baseline sinus 66, no arrhythmias, rare PVC, PAC   • CORONARY ARTERY BYPASS GRAFT  09/10/2009    CROOK-LAD, GUEVARA-Ramus, seq SVG-Om1, OM2. SVG to OM3. SVG to PDA. PFO closure. TV repair   • ECHO - CONVERTED  09/10/2009    ([facility]) Dr. Lucero EF 55%. Anteroseptal WMA   • ECHO - CONVERTED  02/03/2014    EF 55-60%, mild MR, no pericardial effusion   • ECHO - CONVERTED  03/24/2016    EF 55-60%, mild MR, RVSP 19 mmHg, tachycardia   • ECHO - CONVERTED  10/25/2016    EF 55-60%, mild MR   • ECHO - CONVERTED  02/12/2018    EF 65%. No shunt   • ECHO - CONVERTED  03/10/2021    EF 65%. Trace-Mild MR   • ECHO - CONVERTED  11/09/2022    TLS. EF 65%. LA- 3.7. Trace-Mild MR   • ENDOSCOPY  2021   • OTHER SURGICAL HISTORY  07/14/2015    MRI brain- no evidence of CVA or hemorrhage   • US CAROTID UNILATERAL  07/22/2015    no sig stenosis       Current Outpatient Medications   Medication Sig Dispense Refill   • aspirin (EQ Aspirin Adult Low Dose) 81 MG EC tablet Take 1 tablet by mouth once daily 90 tablet 3   • atorvastatin (LIPITOR) 20 MG tablet Take 1 tablet by mouth Daily. 90 tablet 3   • clopidogrel (PLAVIX) 75 MG tablet Take 1 tablet by mouth Daily. 90 tablet 1   • levothyroxine (SYNTHROID, LEVOTHROID) 75 MCG tablet Take 1 tablet by mouth Daily.     • metoclopramide (REGLAN) 10 MG tablet Take 1 tablet by mouth 2 (Two) Times a Day.     • metoprolol succinate XL (TOPROL-XL) 25 MG 24 hr tablet Take 0.5 tablets by mouth 2 (Two) Times a Day. 90 tablet 3   • nitroglycerin (NITROSTAT) 0.4 MG SL tablet Place 1 tablet under the tongue Every 5 (Five) Minutes As Needed for Chest Pain. do not exceed a total of 3 doses in 15 minutes 25 tablet 3   • oxybutynin XL  (DITROPAN-XL) 10 MG 24 hr tablet Take 1 tablet by mouth Daily.     • pantoprazole (PROTONIX) 20 MG EC tablet Take 1 tablet by mouth Daily. 90 tablet 2     No current facility-administered medications for this visit.       Patient has no known allergies.    Past Medical History:   Diagnosis Date   • Anxiety    • CAD (coronary artery disease)     s/p cabg   • Depression    • GERD (gastroesophageal reflux disease)    • H/O breast biopsy     benign   • History of colonoscopy    • History of esophagogastroduodenoscopy (EGD)    • Hypercholesterolemia    • Hyperlipidemia    • Hypertension    • Hypothyroidism    • Insomnia        Social History     Socioeconomic History   • Marital status: Single   Tobacco Use   • Smoking status: Former     Packs/day: 1.50     Years: 36.00     Pack years: 54.00     Types: Cigarettes     Quit date:      Years since quittin.2   • Smokeless tobacco: Never   Vaping Use   • Vaping Use: Never used   Substance and Sexual Activity   • Alcohol use: No   • Drug use: No   • Sexual activity: Defer       Family History   Problem Relation Age of Onset   • Heart attack Father        Review of Systems   Constitutional: Negative for decreased appetite, diaphoresis and malaise/fatigue.   HENT: Negative for nosebleeds.    Eyes: Negative for blurred vision.   Cardiovascular: Negative for chest pain, claudication, cyanosis, dyspnea on exertion, irregular heartbeat, leg swelling, near-syncope, orthopnea, palpitations, paroxysmal nocturnal dyspnea and syncope.   Respiratory: Positive for shortness of breath. Negative for sleep disturbances due to breathing.    Endocrine: Negative for cold intolerance and heat intolerance.   Hematologic/Lymphatic: Does not bruise/bleed easily.   Skin: Negative for rash.   Musculoskeletal: Negative for myalgias.   Gastrointestinal: Negative for heartburn, melena and nausea.   Genitourinary: Negative for dysuria and hematuria.   Neurological: Negative for dizziness  "and light-headedness.   Psychiatric/Behavioral: The patient does not have insomnia and is not nervous/anxious.         BP Readings from Last 5 Encounters:   03/20/23 122/80   09/22/22 130/82   03/09/22 142/86   08/30/21 100/68   02/25/21 134/86       Wt Readings from Last 5 Encounters:   03/20/23 80.5 kg (177 lb 6.4 oz)   09/22/22 82.3 kg (181 lb 6.4 oz)   03/09/22 80.4 kg (177 lb 3.2 oz)   08/30/21 79.9 kg (176 lb 3.2 oz)   02/25/21 79.4 kg (175 lb)       Objective      Labs 11/10/2022: CBC in normal range, TSH 1.97, total cholesterol 157, triglycerides 126, HDL 42, LDL 92, glucose 87, BUN 8, creatinine 0.89, sodium 137, potassium 4.4, chloride 101, CO2 19.7, calcium 9.4, total protein 7, BUN 4.01, ALT 20, AST 48, , total bili 0.7, GFR 73.9    March 2022: Total cholesterol 262, triglycerides 231, , HDL 41, AST 76, ALP 84    /80 (BP Location: Left arm, Patient Position: Sitting)   Pulse 60   Ht 166.4 cm (65.51\")   Wt 80.5 kg (177 lb 6.4 oz)   BMI 29.06 kg/m²     Vitals and nursing note reviewed.   Constitutional:       Appearance: Healthy appearance. Not in distress.   Eyes:      Conjunctiva/sclera: Conjunctivae normal.      Pupils: Pupils are equal, round, and reactive to light.   HENT:      Head: Normocephalic.   Pulmonary:      Effort: Pulmonary effort is normal.      Breath sounds: Normal breath sounds.   Cardiovascular:      PMI at left midclavicular line. Normal rate. Regular rhythm.      Murmurs: There is no murmur.   Pulses:     Intact distal pulses.   Edema:     Peripheral edema absent.   Abdominal:      General: Bowel sounds are normal.      Palpations: Abdomen is soft.   Musculoskeletal: Normal range of motion.      Cervical back: Normal range of motion and neck supple. Skin:     General: Skin is warm and dry.   Neurological:      Mental Status: Alert, oriented to person, place, and time and oriented to person, place and time.          Procedures: none today          Assessment & " Plan   Diagnoses and all orders for this visit:    1. Coronary artery disease involving native coronary artery of native heart without angina pectoris (Primary)  -     nitroglycerin (NITROSTAT) 0.4 MG SL tablet; Place 1 tablet under the tongue Every 5 (Five) Minutes As Needed for Chest Pain. do not exceed a total of 3 doses in 15 minutes  Dispense: 25 tablet; Refill: 3    2. Hx of CABG    3. History of tricuspid valve repair    4. Primary hypertension    5. Mixed hyperlipidemia  -     atorvastatin (LIPITOR) 20 MG tablet; Take 1 tablet by mouth Daily.  Dispense: 90 tablet; Refill: 3    6. Gastroesophageal reflux disease without esophagitis    CAD/history CABG/history stenting  - Results of stress test done November 2022 were reviewed.  Small area of ischemia noted.  -Patient denies anginal symptoms  -Nitrostat updated.  Informational handout on Nitrostat provided  -Patient agrees to call the office should symptoms of concern develop an elective cardiac catheterization will be considered.  -Continue aspirin, Plavix and statin    Tricuspid valve repair  - Echocardiogram 2022, stable tricuspid valve    Hypertension  -BP remains controlled  -DASH diet encouraged  - Continue Toprol XL 12.5 mg twice a day    Hyperlipidemia  -Recent lipid panel showed lipids fairly well controlled compared to prior  -Continue Lipitor  -Of LDL remains greater than 70 consider higher dose statin or adding Zetia  -At this time patient is reluctant to change medication.  - Heart healthy diet encouraged    GERD  -Denies current symptoms  -Currently on Protonix    A 6-month follow-up visit scheduled.  Please call sooner for cardiac concerns.

## 2023-09-26 DIAGNOSIS — I25.10 CORONARY ARTERY DISEASE INVOLVING NATIVE CORONARY ARTERY OF NATIVE HEART WITHOUT ANGINA PECTORIS: ICD-10-CM

## 2023-09-26 DIAGNOSIS — I10 ESSENTIAL HYPERTENSION: ICD-10-CM

## 2023-09-26 RX ORDER — METOPROLOL SUCCINATE 25 MG/1
12.5 TABLET, EXTENDED RELEASE ORAL 2 TIMES DAILY
Qty: 90 TABLET | Refills: 0 | Status: SHIPPED | OUTPATIENT
Start: 2023-09-26

## 2023-10-10 ENCOUNTER — OFFICE VISIT (OUTPATIENT)
Dept: CARDIOLOGY | Facility: CLINIC | Age: 63
End: 2023-10-10
Payer: COMMERCIAL

## 2023-10-10 VITALS
BODY MASS INDEX: 29.25 KG/M2 | SYSTOLIC BLOOD PRESSURE: 110 MMHG | WEIGHT: 182 LBS | DIASTOLIC BLOOD PRESSURE: 70 MMHG | HEART RATE: 78 BPM | HEIGHT: 66 IN

## 2023-10-10 DIAGNOSIS — I25.10 CORONARY ARTERY DISEASE INVOLVING NATIVE CORONARY ARTERY OF NATIVE HEART WITHOUT ANGINA PECTORIS: Primary | ICD-10-CM

## 2023-10-10 DIAGNOSIS — Z95.1 HX OF CABG: ICD-10-CM

## 2023-10-10 DIAGNOSIS — Z98.890 HISTORY OF TRICUSPID VALVE REPAIR: ICD-10-CM

## 2023-10-10 DIAGNOSIS — I10 PRIMARY HYPERTENSION: ICD-10-CM

## 2023-10-10 DIAGNOSIS — R06.02 SHORTNESS OF BREATH: ICD-10-CM

## 2023-10-10 DIAGNOSIS — K21.9 GASTROESOPHAGEAL REFLUX DISEASE WITHOUT ESOPHAGITIS: ICD-10-CM

## 2023-10-10 DIAGNOSIS — K21.9 GASTROESOPHAGEAL REFLUX DISEASE, UNSPECIFIED WHETHER ESOPHAGITIS PRESENT: ICD-10-CM

## 2023-10-10 DIAGNOSIS — I10 ESSENTIAL HYPERTENSION: ICD-10-CM

## 2023-10-10 PROCEDURE — 3074F SYST BP LT 130 MM HG: CPT | Performed by: NURSE PRACTITIONER

## 2023-10-10 PROCEDURE — 3078F DIAST BP <80 MM HG: CPT | Performed by: NURSE PRACTITIONER

## 2023-10-10 PROCEDURE — 99214 OFFICE O/P EST MOD 30 MIN: CPT | Performed by: NURSE PRACTITIONER

## 2023-10-10 RX ORDER — METOPROLOL SUCCINATE 25 MG/1
12.5 TABLET, EXTENDED RELEASE ORAL 2 TIMES DAILY
Qty: 90 TABLET | Refills: 2 | Status: SHIPPED | OUTPATIENT
Start: 2023-10-10

## 2023-10-10 RX ORDER — ASPIRIN 81 MG/1
TABLET ORAL
Qty: 90 TABLET | Refills: 2 | Status: SHIPPED | OUTPATIENT
Start: 2023-10-10

## 2023-10-10 RX ORDER — CLOPIDOGREL BISULFATE 75 MG/1
75 TABLET ORAL DAILY
Qty: 90 TABLET | Refills: 2 | Status: SHIPPED | OUTPATIENT
Start: 2023-10-10

## 2023-10-10 RX ORDER — PANTOPRAZOLE SODIUM 20 MG/1
20 TABLET, DELAYED RELEASE ORAL DAILY
Qty: 90 TABLET | Refills: 2 | Status: SHIPPED | OUTPATIENT
Start: 2023-10-10

## 2023-10-10 NOTE — PROGRESS NOTES
Chief Complaint   Patient presents with    Follow-up     Cardiac management. Has no cardiac complaints today    LABS     Current labs November 2022 on chart    Med Refill     Needs refills on Plavix, ASA , Protonix and [facility]       Amina Nath is a 63 y.o. femalewith HTN, hypothyroidism, major depressive disorder, and IHD diagnosed in 2009 when she was found to have severe three-vessel disease and also possible endocarditis involving the tricuspid valve. She underwent bypass surgery, tricuspid valve repair as well as PFO closure at that time. In 2014 she established care here.  Cardiac cath in 2017 revealed significant disease of SVG to RCA and GUEVARA to ramus and had stents placed in each graft in July 2017.     On 11/9/2022 Lexiscan stress test showed small inferior lateral wall ischemia.  Recommendation was medical management if symptoms persist then elective cardiac catheterization.     Today she returns to the office for follow-up visit.  She denies chest pain or palpitations.  For exercise [place] the library [place] be about a fourth a mile daily.  She admits to increased shortness of breath which is a little concerning to her.  No dizziness, edema, or TIA symptoms noted.  No recent change in cardiac medication management noted    Cardiac History:    Past Surgical History:   Procedure Laterality Date    CARDIAC CATHETERIZATION  09/10/2009    Dr. HOLLIDAY 50% LM, 90% LAD, 80% LCX, 70% Ramus, 80% RCA.    CARDIAC CATHETERIZATION  07/25/2017    100% SVG to RCA. 2.25x8 Promus to RCA. 95% GUEVARA to Ramus- 2.25x12 Promus    CARDIOVASCULAR STRESS TEST  06/28/2012    5 min 88% THR. Negative    CARDIOVASCULAR STRESS TEST  02/03/2014    L. Myoview- negative for ischemia    CARDIOVASCULAR STRESS TEST  03/24/2016    Mod Elvis-9 min, 84% THR, 142/98, no definite ischemia, increase Metoprolol    CARDIOVASCULAR STRESS TEST  10/25/2016    EF 65%, no  ischemia    CARDIOVASCULAR STRESS TEST  02/12/2018    L. Cardiolite- EF 65%. Small Inferoseptal Ischemia.    CARDIOVASCULAR STRESS TEST  03/10/2021    Lexiscan- EF > 70%. Negative    CARDIOVASCULAR STRESS TEST  11/09/2022    Lexiscan- EF > 70%. Small Inferolateral Ischemia    COLONOSCOPY  2021    CONVERTED (HISTORICAL) HOLTER  08/11/2015    baseline sinus 66, no arrhythmias, rare PVC, PAC    CORONARY ARTERY BYPASS GRAFT  09/10/2009    CROOK-LAD, GUEVARA-Ramus, seq SVG-Om1, OM2. SVG to OM3. SVG to PDA. PFO closure. TV repair    ECHO - CONVERTED  09/10/2009    ([facility]) Dr. Lucero EF 55%. Anteroseptal WMA    ECHO - CONVERTED  02/03/2014    EF 55-60%, mild MR, no pericardial effusion    ECHO - CONVERTED  03/24/2016    EF 55-60%, mild MR, RVSP 19 mmHg, tachycardia    ECHO - CONVERTED  10/25/2016    EF 55-60%, mild MR    ECHO - CONVERTED  02/12/2018    EF 65%. No shunt    ECHO - CONVERTED  03/10/2021    EF 65%. Trace-Mild MR    ECHO - CONVERTED  11/09/2022    TLS. EF 65%. LA- 3.7. Trace-Mild MR    ENDOSCOPY  2021    OTHER SURGICAL HISTORY  07/14/2015    MRI brain- no evidence of CVA or hemorrhage    US CAROTID UNILATERAL  07/22/2015    no sig stenosis       Current Outpatient Medications   Medication Sig Dispense Refill    aspirin 81 MG EC tablet Take 1 tablet by mouth once daily 90 tablet 2    atorvastatin (LIPITOR) 20 MG tablet Take 1 tablet by mouth Daily. 90 tablet 3    clopidogrel (PLAVIX) 75 MG tablet Take 1 tablet by mouth Daily. 90 tablet 2    levothyroxine (SYNTHROID, LEVOTHROID) 75 MCG tablet Take 1 tablet by mouth Daily.      metoclopramide (REGLAN) 10 MG tablet Take 1 tablet by mouth 2 (Two) Times a Day.      metoprolol succinate XL (TOPROL-XL) 25 MG 24 hr tablet Take 0.5 tablets by mouth 2 (Two) Times a Day. 90 tablet 2    nitroglycerin (NITROSTAT) 0.4 MG SL tablet DISSOLVE ONE TABLET UNDER THE TONGUE EVERY 5 MINUTES AS NEEDED FOR CHEST PAIN.  DO NOT EXCEED A TOTAL OF 3 DOSES IN 15 MINUTES 75 tablet 0    oxybutynin  XL (DITROPAN-XL) 10 MG 24 hr tablet Take 1 tablet by mouth Daily.      pantoprazole (PROTONIX) 20 MG EC tablet Take 1 tablet by mouth Daily. 90 tablet 2     No current facility-administered medications for this visit.       Patient has no known allergies.    Past Medical History:   Diagnosis Date    Anxiety     CAD (coronary artery disease)     s/p cabg    Depression     GERD (gastroesophageal reflux disease)     H/O breast biopsy     benign    History of colonoscopy     History of esophagogastroduodenoscopy (EGD)     Hypercholesterolemia     Hyperlipidemia     Hypertension     Hypothyroidism     Insomnia        Social History     Socioeconomic History    Marital status: Single   Tobacco Use    Smoking status: Former     Packs/day: 1.50     Years: 36.00     Additional pack years: 0.00     Total pack years: 54.00     Types: Cigarettes     Quit date:      Years since quittin.7    Smokeless tobacco: Never   Vaping Use    Vaping Use: Never used   Substance and Sexual Activity    Alcohol use: No    Drug use: No    Sexual activity: Defer       Family History   Problem Relation Age of Onset    Heart attack Father        Review of Systems   Constitutional: Negative for diaphoresis and fever.   Eyes:  Negative for visual disturbance.   Cardiovascular:  Negative for chest pain, dyspnea on exertion, leg swelling, near-syncope and palpitations.   Respiratory:  Positive for shortness of breath. Negative for sleep disturbances due to breathing.    Endocrine: Negative for polydipsia, polyphagia and polyuria.   Hematologic/Lymphatic: Negative for bleeding problem.   Skin:  Negative for color change.   Musculoskeletal:  Negative for falls.   Gastrointestinal:  Negative for change in bowel habit.   Genitourinary:  Negative for dysuria and hematuria.   Neurological:  Negative for dizziness and headaches.   Psychiatric/Behavioral:  Positive for depression. Negative for memory loss. The patient is nervous/anxious.      "    BP Readings from Last 5 Encounters:   10/10/23 110/70   03/20/23 122/80   09/22/22 130/82   03/09/22 142/86   08/30/21 100/68       Wt Readings from Last 5 Encounters:   10/10/23 82.6 kg (182 lb)   03/20/23 80.5 kg (177 lb 6.4 oz)   09/22/22 82.3 kg (181 lb 6.4 oz)   03/09/22 80.4 kg (177 lb 3.2 oz)   08/30/21 79.9 kg (176 lb 3.2 oz)       Objective     Labs 11/10/2022: CBC in normal range, TSH 1.97, total cholesterol 157, triglycerides 126, HDL 42, LDL 92, glucose 87, BUN 8, creatinine 0.89, sodium 137, potassium 4.4, chloride 101, CO2 19.7, calcium 9.4, total protein 7, BUN 4.01, ALT 20, AST 48, , total bili 0.7, GFR 73.9     /70 (BP Location: Left arm, Patient Position: Sitting)   Pulse 78   Ht 166.4 cm (65.51\")   Wt 82.6 kg (182 lb)   BMI 29.82 kg/mý     Vitals and nursing note reviewed.   Constitutional:       Appearance: Not in distress. Obese.   Neck:      Vascular: No carotid bruit or JVD.   Pulmonary:      Effort: Pulmonary effort is normal.      Breath sounds: Normal breath sounds.   Cardiovascular:      PMI at left midclavicular line. Normal rate. Regular rhythm.      Murmurs: There is no murmur.   Pulses:     Intact distal pulses.   Edema:     Peripheral edema absent.   Psychiatric:         Speech: Speech normal.         Behavior: Behavior is cooperative.         Cognition and Memory: Cognition normal.          Procedures: none today          Assessment & Plan   Diagnoses and all orders for this visit:    1. Coronary artery disease involving native coronary artery of native heart without angina pectoris (Primary)  -     aspirin 81 MG EC tablet; Take 1 tablet by mouth once daily  Dispense: 90 tablet; Refill: 2  -     clopidogrel (PLAVIX) 75 MG tablet; Take 1 tablet by mouth Daily.  Dispense: 90 tablet; Refill: 2  -     metoprolol succinate XL (TOPROL-XL) 25 MG 24 hr tablet; Take 0.5 tablets by mouth 2 (Two) Times a Day.  Dispense: 90 tablet; Refill: 2    2. Hx of CABG  -     aspirin " 81 MG EC tablet; Take 1 tablet by mouth once daily  Dispense: 90 tablet; Refill: 2  -     clopidogrel (PLAVIX) 75 MG tablet; Take 1 tablet by mouth Daily.  Dispense: 90 tablet; Refill: 2    3. History of tricuspid valve repair  -     Adult Transthoracic Echo Complete W/ Cont if Necessary Per Protocol; Future    4. Primary hypertension    5. Gastroesophageal reflux disease without esophagitis    6. Essential hypertension  -     metoprolol succinate XL (TOPROL-XL) 25 MG 24 hr tablet; Take 0.5 tablets by mouth 2 (Two) Times a Day.  Dispense: 90 tablet; Refill: 2    7. Shortness of breath  -     Adult Transthoracic Echo Complete W/ Cont if Necessary Per Protocol; Future    8. Gastroesophageal reflux disease, unspecified whether esophagitis present  -     pantoprazole (PROTONIX) 20 MG EC tablet; Take 1 tablet by mouth Daily.  Dispense: 90 tablet; Refill: 2    CAD/hx CABG/hx stenting  -Stress test 11/2022: Small area ischemia  -Continue aspirin, Plavix, statin, as needed Nitrostat  -Patient admits to increased shortness of breath, denies Nitrostat use, declines repeat stress test    Tricuspid valve repair  -Echocardiogram 2022, stable valve  -Admits to concern over increased shortness of breath, repeat echocardiogram ordered to relook at valvular structure and overall cardiac status      Hypertension  -BP controlled  -DASH diet encouraged  - Continue Toprol XL 12.5 mg twice a day     Hyperlipidemia  - Labs managed through your office  -Continue Lipitor  -Of LDL remains greater than 70 consider higher dose statin or adding Zetia     GERD  -Denies current symptoms  -Currently on Protonix    Further recommendations based on echocardiogram results    6-month follow-up visit scheduled.

## 2023-10-17 ENCOUNTER — HOSPITAL ENCOUNTER (OUTPATIENT)
Dept: CARDIOLOGY | Facility: HOSPITAL | Age: 63
Discharge: HOME | End: 2023-10-17
Admitting: NURSE PRACTITIONER
Payer: COMMERCIAL

## 2023-10-17 DIAGNOSIS — R06.02 SHORTNESS OF BREATH: ICD-10-CM

## 2023-10-17 DIAGNOSIS — Z98.890 HISTORY OF TRICUSPID VALVE REPAIR: ICD-10-CM

## 2023-10-17 LAB
BH CV ECHO MEAS - ACS: 1.52 CM
BH CV ECHO MEAS - AO MAX PG: 5.3 MMHG
BH CV ECHO MEAS - AO MEAN PG: 3 MMHG
BH CV ECHO MEAS - AO ROOT DIAM: 2.9 CM
BH CV ECHO MEAS - AO V2 MAX: 115 CM/SEC
BH CV ECHO MEAS - AO V2 VTI: 26.1 CM
BH CV ECHO MEAS - EDV(CUBED): 71.5 ML
BH CV ECHO MEAS - EDV(MOD-SP4): 43.9 ML
BH CV ECHO MEAS - EF(MOD-SP4): 56.7 %
BH CV ECHO MEAS - ESV(CUBED): 8.6 ML
BH CV ECHO MEAS - ESV(MOD-SP4): 19 ML
BH CV ECHO MEAS - FS: 50.6 %
BH CV ECHO MEAS - IVS/LVPW: 0.96 CM
BH CV ECHO MEAS - IVSD: 1.1 CM
BH CV ECHO MEAS - LA DIMENSION: 3.6 CM
BH CV ECHO MEAS - LAT PEAK E' VEL: 7.8 CM/SEC
BH CV ECHO MEAS - LV DIASTOLIC VOL/BSA (35-75): 23.1 CM2
BH CV ECHO MEAS - LV MASS(C)D: 159.2 GRAMS
BH CV ECHO MEAS - LV SYSTOLIC VOL/BSA (12-30): 10 CM2
BH CV ECHO MEAS - LVIDD: 4.2 CM
BH CV ECHO MEAS - LVIDS: 2.05 CM
BH CV ECHO MEAS - LVPWD: 1.15 CM
BH CV ECHO MEAS - MED PEAK E' VEL: 6.5 CM/SEC
BH CV ECHO MEAS - MV A MAX VEL: 95.5 CM/SEC
BH CV ECHO MEAS - MV DEC TIME: 0.24 SEC
BH CV ECHO MEAS - MV E MAX VEL: 63.4 CM/SEC
BH CV ECHO MEAS - MV E/A: 0.66
BH CV ECHO MEAS - RAP SYSTOLE: 10 MMHG
BH CV ECHO MEAS - RVSP: 29.1 MMHG
BH CV ECHO MEAS - SI(MOD-SP4): 13.1 ML/M2
BH CV ECHO MEAS - SV(MOD-SP4): 24.9 ML
BH CV ECHO MEAS - TR MAX PG: 19.1 MMHG
BH CV ECHO MEAS - TR MAX VEL: 218.5 CM/SEC
BH CV ECHO MEASUREMENTS AVERAGE E/E' RATIO: 8.87
BH CV FAKE - TV VALVE AREA P 1/2 METHOD: 2.18 CM2
BH CV XLRA - RV BASE: 3.6 CM
BH CV XLRA - RV LENGTH: 7.6 CM
BH CV XLRA - RV MID: 4 CM
LEFT ATRIUM VOLUME INDEX: 15.6 ML/M2
TV MEAN GRADIENT: 2 MMHG
TV PEAK GRADIENT: 5 MMHG
TV STENOSIS PRESSURE HALF TIME: 87 MS
TV VTI: 44 CM

## 2023-10-17 PROCEDURE — 93306 TTE W/DOPPLER COMPLETE: CPT

## 2023-10-17 PROCEDURE — 93306 TTE W/DOPPLER COMPLETE: CPT | Performed by: INTERNAL MEDICINE

## 2024-02-01 NOTE — PROGRESS NOTES
Admitting Diagnosis:   Patient is a 74y old  Male who presents with a chief complaint of CAD (01 Feb 2024 13:28)      PAST MEDICAL & SURGICAL HISTORY:  Chronic kidney disease, unspecified CKD stage      HTN (hypertension)      HLD (hyperlipidemia)      DM (diabetes mellitus)      CAD (coronary artery disease)          Current Nutrition Order: Consistent carbohydrate - Puree (halal) + ensure enlive TID    PO Intake: Good (%) [   ]  Fair (50-75%) [   ] Poor (<25%) [   ]    GI Issues: No N/V/D    Pain: Pain noted, regimen in place    Skin Integrity: Joo score 19; surgical incisions noted, CT removed today    Labs:   02-01    128<L>  |  85<L>  |  55<H>  ----------------------------<  224<H>  3.5   |  30  |  2.15<H>    Ca    9.1      01 Feb 2024 10:12  Phos  6.0     02-01  Mg     2.2     02-01    TPro  5.6<L>  /  Alb  2.7<L>  /  TBili  1.0  /  DBili  x   /  AST  See Note  /  ALT  See Note  /  AlkPhos  See Note  02-01    CAPILLARY BLOOD GLUCOSE      POCT Blood Glucose.: 215 mg/dL (01 Feb 2024 16:04)  POCT Blood Glucose.: 231 mg/dL (01 Feb 2024 11:26)  POCT Blood Glucose.: 173 mg/dL (01 Feb 2024 06:34)  POCT Blood Glucose.: 170 mg/dL (31 Jan 2024 21:42)      Medications:  MEDICATIONS  (STANDING):  aMIOdarone    Tablet   Oral   aMIOdarone    Tablet 400 milliGRAM(s) Oral every 8 hours  aspirin enteric coated 81 milliGRAM(s) Oral daily  atorvastatin 40 milliGRAM(s) Oral at bedtime  bisacodyl Suppository 10 milliGRAM(s) Rectal once  buDESOnide    Inhalation Suspension 0.25 milliGRAM(s) Inhalation every 12 hours  cefTRIAXone   IVPB 1000 milliGRAM(s) IV Intermittent every 24 hours  dextrose 5%. 1000 milliLiter(s) (50 mL/Hr) IV Continuous <Continuous>  dextrose 5%. 1000 milliLiter(s) (100 mL/Hr) IV Continuous <Continuous>  dextrose 50% Injectable 12.5 Gram(s) IV Push once  dextrose 50% Injectable 25 Gram(s) IV Push once  dextrose 50% Injectable 25 Gram(s) IV Push once  glucagon  Injectable 1 milliGRAM(s) IntraMuscular once  heparin   Injectable 5000 Unit(s) SubCutaneous every 8 hours  insulin glargine Injectable (LANTUS) 6 Unit(s) SubCutaneous at bedtime  insulin lispro (ADMELOG) corrective regimen sliding scale   SubCutaneous Before meals and at bedtime  ipratropium    for Nebulization 500 MICROGram(s) Nebulizer every 6 hours  levalbuterol Inhalation 0.63 milliGRAM(s) Inhalation every 6 hours  lidocaine 2% Injectable 10 milliLiter(s) Local Injection once  metoprolol tartrate 12.5 milliGRAM(s) Oral every 12 hours  pantoprazole    Tablet 40 milliGRAM(s) Oral before breakfast  petrolatum white Ointment 1 Application(s) Topical two times a day  senna 2 Tablet(s) Oral at bedtime  sodium chloride 0.65% Nasal 2 Spray(s) Both Nostrils every 4 hours  sodium chloride 0.9% for Nebulization 3 milliLiter(s) Nebulizer every 8 hours  sodium chloride 0.9% lock flush 3 milliLiter(s) IV Push every 8 hours    MEDICATIONS  (PRN):  acetaminophen     Tablet .. 650 milliGRAM(s) Oral every 6 hours PRN Mild Pain (1 - 3)  dextrose Oral Gel 15 Gram(s) Oral once PRN Blood Glucose LESS THAN 70 milliGRAM(s)/deciliter  oxyCODONE    IR 5 milliGRAM(s) Oral every 4 hours PRN Moderate Pain (4 - 6)  polyethylene glycol 3350 17 Gram(s) Oral daily PRN Constipation    Admission Anthropometrics:  Height for BMI (FEET)	5 Feet  Height for BMI (INCHES)	5 Inch(s)  Height for BMI (CENTIMETERS)	165.1 Centimeter(s)  Weight for BMI (lbs)	148 lb  Weight for BMI (kg)	67.1 kg  Body Mass Index	24.6      Estimated energy needs:   Estimated Energy Needs From (aditi/kg)	30  Estimated Energy Needs To (aditi/kg)	35  Estimated Energy Needs Calculated From (aditi/kg)	2013  Estimated Energy Needs Calculated To (aditi/kg)	2348    Estimated Protein Needs From (g/kg)	1.2  Estimated Protein Needs To (g/kg)	1.5  Estimated Protein Needs Calculated From (g/kg)	80.52  Estimated Protein Needs Calculated To (g/kg)	100.65    Estimated Fluid Needs From (ml/kg)	30  Estimated Fluid Needs To (ml/kg)	35  Estimated Fluid Needs Calculated From (ml/kg)	2013  Estimated Fluid Needs Calculated To (ml/kg)	2348    Based on Standards of Care pt within % IBW (109%) thus actual body weight used for all calculations. Needs adjusted for advanced age and malnutrition.    Subjective:   73yoM PMH HTN, HLD, DM, CKD, CAD s/p stent >10 years ago, and anemia, who initially presented to [facility] on 1/21/24 with 5/10 CP. On a hospital admission to [place] a week prior he was treated for pneumonia, and had underwent a cardiac cath where he was found to have CAD and was instructed to follow up outpatient with Dr. Perdomo. When he re-presented to the ED with chest pain he was found to have a NSTEMI, and was transferred to [facility] for further evaluation under the care of Dr. Hartmann. He was transferred to the care of Dr. Muñiz. On 1/25 he underwent CABG (LIMA-LAD, SVG-Diag) with Dr. uMñiz. He arrived to the CTICU on no gtts, and was extubated on POD0. POD1 he was placed on intermittent bipap and hfnc for increased WOB and was transfused 1PRBC. His lactate increased and was started on inotropic support, and underwent a TTE to r/o effusion. POD2 he had a left pigtail placed which drained 800cc, and went into afib with RVR. POD3 he was diuresed, remained on primacor and levo and had an episode of epistaxis. POD4 his epistaxis resolved, continued diuresis, remained on hfnc, was bronched which revealed bloody secretions, and his boateng and primacor were d/c'd. POD5 his diet was advanced. POD6 he remained stable, was de-lined and transferred to the floor. POD7 PW and Pigtail removed, ambulating with assistance, Sodium down-trending, urine lytes sent, bedside TTE performed showing a stable pericardial effusion.         Previous Nutrition Diagnosis:  Malnutrition... Moderate protein calorie malnutrition in context of acute illness related to increased nutrient needs as evidenced by moderate muscle and fat wasting    Active [ X ]  Resolved [   ]      Goal: Patient to meet at least 75% of nutritional needs consistently    Recommendations:  1. Continue with consistent carbohydrate, low sodium diet. Recommend to add MVI. Consider adding Glucerna 1x/day (220 kcal,10 g protein per serving) if patient agreeable.   2. Encourage pt to meet nutritional needs as able   3. Monitor labs: electrolytes, cmp, fingersticks  4. Monitor weights   5. Pain and bowel regimen per team   6. Will continue to assess/honor food preferences as able    Education:     Risk Level: High [ X  ] Moderate [   ] Low [   ] She was not supposed to have her labs checked this soon. It hasn't even been a full 2 weeks. Tell her to call back in about 2 months for repeat lab order or see PCP. Limit tylenol, alcohol. Please send to PCP.  Admitting Diagnosis:   Patient is a 74y old  Male who presents with a chief complaint of CAD (01 Feb 2024 13:28)      PAST MEDICAL & SURGICAL HISTORY:  Chronic kidney disease, unspecified CKD stage      HTN (hypertension)      HLD (hyperlipidemia)      DM (diabetes mellitus)      CAD (coronary artery disease)          Current Nutrition Order: Consistent carbohydrate - Puree (halal) + ensure enlive TID    PO Intake: Good (%) [   ]  Fair (50-75%) [ X ] Poor (<25%) [   ]    GI Issues: No N/V/D    Pain: Pain noted, regimen in place    Skin Integrity: Joo score 19; surgical incisions noted, CT removed today    Labs:   02-01    128<L>  |  85<L>  |  55<H>  ----------------------------<  224<H>  3.5   |  30  |  2.15<H>    Ca    9.1      01 Feb 2024 10:12  Phos  6.0     02-01  Mg     2.2     02-01    TPro  5.6<L>  /  Alb  2.7<L>  /  TBili  1.0  /  DBili  x   /  AST  See Note  /  ALT  See Note  /  AlkPhos  See Note  02-01    CAPILLARY BLOOD GLUCOSE      POCT Blood Glucose.: 215 mg/dL (01 Feb 2024 16:04)  POCT Blood Glucose.: 231 mg/dL (01 Feb 2024 11:26)  POCT Blood Glucose.: 173 mg/dL (01 Feb 2024 06:34)  POCT Blood Glucose.: 170 mg/dL (31 Jan 2024 21:42)      Medications:  MEDICATIONS  (STANDING):  aMIOdarone    Tablet   Oral   aMIOdarone    Tablet 400 milliGRAM(s) Oral every 8 hours  aspirin enteric coated 81 milliGRAM(s) Oral daily  atorvastatin 40 milliGRAM(s) Oral at bedtime  bisacodyl Suppository 10 milliGRAM(s) Rectal once  buDESOnide    Inhalation Suspension 0.25 milliGRAM(s) Inhalation every 12 hours  cefTRIAXone   IVPB 1000 milliGRAM(s) IV Intermittent every 24 hours  dextrose 5%. 1000 milliLiter(s) (50 mL/Hr) IV Continuous <Continuous>  dextrose 5%. 1000 milliLiter(s) (100 mL/Hr) IV Continuous <Continuous>  dextrose 50% Injectable 12.5 Gram(s) IV Push once  dextrose 50% Injectable 25 Gram(s) IV Push once  dextrose 50% Injectable 25 Gram(s) IV Push once  glucagon  Injectable 1 milliGRAM(s) IntraMuscular once  heparin   Injectable 5000 Unit(s) SubCutaneous every 8 hours  insulin glargine Injectable (LANTUS) 6 Unit(s) SubCutaneous at bedtime  insulin lispro (ADMELOG) corrective regimen sliding scale   SubCutaneous Before meals and at bedtime  ipratropium    for Nebulization 500 MICROGram(s) Nebulizer every 6 hours  levalbuterol Inhalation 0.63 milliGRAM(s) Inhalation every 6 hours  lidocaine 2% Injectable 10 milliLiter(s) Local Injection once  metoprolol tartrate 12.5 milliGRAM(s) Oral every 12 hours  pantoprazole    Tablet 40 milliGRAM(s) Oral before breakfast  petrolatum white Ointment 1 Application(s) Topical two times a day  senna 2 Tablet(s) Oral at bedtime  sodium chloride 0.65% Nasal 2 Spray(s) Both Nostrils every 4 hours  sodium chloride 0.9% for Nebulization 3 milliLiter(s) Nebulizer every 8 hours  sodium chloride 0.9% lock flush 3 milliLiter(s) IV Push every 8 hours    MEDICATIONS  (PRN):  acetaminophen     Tablet .. 650 milliGRAM(s) Oral every 6 hours PRN Mild Pain (1 - 3)  dextrose Oral Gel 15 Gram(s) Oral once PRN Blood Glucose LESS THAN 70 milliGRAM(s)/deciliter  oxyCODONE    IR 5 milliGRAM(s) Oral every 4 hours PRN Moderate Pain (4 - 6)  polyethylene glycol 3350 17 Gram(s) Oral daily PRN Constipation    Admission Anthropometrics:  Height for BMI (FEET)	5 Feet  Height for BMI (INCHES)	5 Inch(s)  Height for BMI (CENTIMETERS)	165.1 Centimeter(s)  Weight for BMI (lbs)	148 lb  Weight for BMI (kg)	67.1 kg  Body Mass Index	24.6      Estimated energy needs:   Estimated Energy Needs From (aditi/kg)	30  Estimated Energy Needs To (aditi/kg)	35  Estimated Energy Needs Calculated From (aditi/kg)	2013  Estimated Energy Needs Calculated To (aditi/kg)	2348    Estimated Protein Needs From (g/kg)	1.2  Estimated Protein Needs To (g/kg)	1.5  Estimated Protein Needs Calculated From (g/kg)	80.52  Estimated Protein Needs Calculated To (g/kg)	100.65    Estimated Fluid Needs From (ml/kg)	30  Estimated Fluid Needs To (ml/kg)	35  Estimated Fluid Needs Calculated From (ml/kg)	2013  Estimated Fluid Needs Calculated To (ml/kg)	2348    Based on Standards of Care pt within % IBW (109%) thus actual body weight used for all calculations. Needs adjusted for advanced age and malnutrition.    Subjective:   73yoM PMH HTN, HLD, DM, CKD, CAD s/p stent >10 years ago, and anemia, who initially presented to [facility] on 1/21/24 with 5/10 CP. On a hospital admission to [place] a week prior he was treated for pneumonia, and had underwent a cardiac cath where he was found to have CAD and was instructed to follow up outpatient with Dr. Perdomo. When he re-presented to the ED with chest pain he was found to have a NSTEMI, and was transferred to [facility] for further evaluation under the care of Dr. Hartmann. He was transferred to the care of Dr. Muñiz. On 1/25 he underwent CABG (LIMA-LAD, SVG-Diag) with Dr. Muñiz. He arrived to the CTICU on no gtts, and was extubated on POD0. POD1 he was placed on intermittent bipap and hfnc for increased WOB and was transfused 1PRBC. His lactate increased and was started on inotropic support, and underwent a TTE to r/o effusion. POD2 he had a left pigtail placed which drained 800cc, and went into afib with RVR. POD3 he was diuresed, remained on primacor and levo and had an episode of epistaxis. POD4 his epistaxis resolved, continued diuresis, remained on hfnc, was bronched which revealed bloody secretions, and his boateng and primacor were d/c'd. POD5 his diet was advanced. POD6 he remained stable, was de-lined and transferred to the floor. POD7 PW and Pigtail removed, ambulating with assistance, Sodium down-trending, urine lytes sent, bedside TTE performed showing a stable pericardial effusion.     Pt assessed at bedside. Pt tolerating diet well however with poor-fair PO intake. Encouraged supplementation with oral supplement (change from ensure to glucerna for glycemic control). Reviewed focus on nutrient dense foods. RD to follow closely.       Previous Nutrition Diagnosis:  Malnutrition... Moderate protein calorie malnutrition in context of acute illness related to increased nutrient needs as evidenced by moderate muscle and fat wasting    Active [ X ]  Resolved [   ]      Goal: Patient to meet at least 75% of nutritional needs consistently    Recommendations:  1. Continue with consistent carbohydrate, puree diet per SLP (rupa)  -Recommend to add MVI  -Recommend change ensure to Glucerna  2. Encourage pt to meet nutritional needs as able   3. Monitor labs: electrolytes, cmp, fingersticks  4. Monitor weights   5. Pain and bowel regimen per team   6. Will continue to assess/honor food preferences as able    Education: Reviewed intake goals    Risk Level: High [ X  ] Moderate [   ] Low [   ]

## 2024-04-16 ENCOUNTER — TELEPHONE (OUTPATIENT)
Dept: CARDIOLOGY | Facility: CLINIC | Age: 64
End: 2024-04-16
Payer: COMMERCIAL

## 2024-04-16 NOTE — TELEPHONE ENCOUNTER
Caller: Amina Nath    Relationship to patient: Self    Best call back number: [phone]    Chief complaint: PT HAS AN APPT WITH [facility] TOMORROW THE 17TH AND NEEDS TO RESCHEDULE.     Type of visit: 6 GALILEO FU    Requested date: STARTING NEXT WEEK     If rescheduling, when is the original appointment: 04.17.24     Additional notes: PT IS WONDERING IF THEY COULD SEE ANYONE SOONER BECAUSE [facility] NEXT AVAILABLE IS IN SEPTEMBER

## 2024-04-29 ENCOUNTER — OFFICE VISIT (OUTPATIENT)
Dept: CARDIOLOGY | Facility: CLINIC | Age: 64
End: 2024-04-29
Payer: COMMERCIAL

## 2024-04-29 VITALS
HEART RATE: 78 BPM | WEIGHT: 178.6 LBS | BODY MASS INDEX: 28.7 KG/M2 | SYSTOLIC BLOOD PRESSURE: 136 MMHG | HEIGHT: 66 IN | DIASTOLIC BLOOD PRESSURE: 80 MMHG

## 2024-04-29 DIAGNOSIS — K21.9 GASTROESOPHAGEAL REFLUX DISEASE, UNSPECIFIED WHETHER ESOPHAGITIS PRESENT: ICD-10-CM

## 2024-04-29 DIAGNOSIS — I10 ESSENTIAL HYPERTENSION: ICD-10-CM

## 2024-04-29 DIAGNOSIS — I25.10 CORONARY ARTERY DISEASE INVOLVING NATIVE CORONARY ARTERY OF NATIVE HEART WITHOUT ANGINA PECTORIS: ICD-10-CM

## 2024-04-29 DIAGNOSIS — Z95.1 HX OF CABG: ICD-10-CM

## 2024-04-29 DIAGNOSIS — Z98.890 HISTORY OF TRICUSPID VALVE REPAIR: Primary | ICD-10-CM

## 2024-04-29 DIAGNOSIS — I10 PRIMARY HYPERTENSION: ICD-10-CM

## 2024-04-29 DIAGNOSIS — E78.2 MIXED HYPERLIPIDEMIA: ICD-10-CM

## 2024-04-29 PROCEDURE — 99214 OFFICE O/P EST MOD 30 MIN: CPT | Performed by: NURSE PRACTITIONER

## 2024-04-29 PROCEDURE — 3075F SYST BP GE 130 - 139MM HG: CPT | Performed by: NURSE PRACTITIONER

## 2024-04-29 PROCEDURE — 3079F DIAST BP 80-89 MM HG: CPT | Performed by: NURSE PRACTITIONER

## 2024-04-29 RX ORDER — METOPROLOL SUCCINATE 25 MG/1
12.5 TABLET, EXTENDED RELEASE ORAL 2 TIMES DAILY
Qty: 90 TABLET | Refills: 2 | Status: SHIPPED | OUTPATIENT
Start: 2024-04-29

## 2024-04-29 RX ORDER — NITROGLYCERIN 0.4 MG/1
0.4 TABLET SUBLINGUAL
Qty: 25 TABLET | Refills: 1 | Status: SHIPPED | OUTPATIENT
Start: 2024-04-29

## 2024-04-29 RX ORDER — PANTOPRAZOLE SODIUM 20 MG/1
20 TABLET, DELAYED RELEASE ORAL DAILY
Qty: 90 TABLET | Refills: 2 | Status: SHIPPED | OUTPATIENT
Start: 2024-04-29

## 2024-04-29 RX ORDER — ASPIRIN 81 MG/1
TABLET ORAL
Qty: 90 TABLET | Refills: 2 | Status: SHIPPED | OUTPATIENT
Start: 2024-04-29

## 2024-04-29 RX ORDER — CLOPIDOGREL BISULFATE 75 MG/1
75 TABLET ORAL DAILY
Qty: 90 TABLET | Refills: 2 | Status: SHIPPED | OUTPATIENT
Start: 2024-04-29

## 2024-04-29 RX ORDER — ATORVASTATIN CALCIUM 20 MG/1
20 TABLET, FILM COATED ORAL DAILY
Qty: 90 TABLET | Refills: 3 | Status: SHIPPED | OUTPATIENT
Start: 2024-04-29

## 2024-04-29 NOTE — PROGRESS NOTES
Chief Complaint   Patient presents with    Follow-up     Cardiac management. Patient reports she had tried to do Yoga and has discomfort in left chest while doing arm exercise.     LABS     No current labs    Med Refill     Needs refills on Aspirin, Plavix, Toprol, Protonix , Lipitor 90 day supply and Nitro SL tabs to [facility]        HPI:  NATHAN Nath is a 64 y.o. femalewith HTN, hypothyroidism, major depressive disorder, and IHD diagnosed in 2009 when she was found to have severe three-vessel disease and also possible endocarditis involving the tricuspid valve. She underwent bypass surgery, tricuspid valve repair as well as PFO closure at that time. In 2014 she established care here.  Cardiac cath in 2017 revealed significant disease of SVG to RCA and GUEVARA to ramus and had stents placed in each graft in July 2017.     On 11/9/2022 Lexiscan stress test showed small inferior lateral wall ischemia.  Recommendation was medical management if symptoms persist then elective cardiac catheterization.      Today she returns to the office for follow-up visit.  Repeat echocardiogram 10/17/2023 stable. Patient denies chest pain, pressure, palpitations, tightness, dizziness, shortness of air. She reports she had pain in left chest when she tried yoga when she lifted her left arm. Reassured her that is likely musculoskeletal if it only occurred with movement and relieved with movement.    Cardiac History:    Past Surgical History:   Procedure Laterality Date    CARDIAC CATHETERIZATION  09/10/2009    Dr. HOLLIDAY 50% LM, 90% LAD, 80% LCX, 70% Ramus, 80% RCA.    CARDIAC CATHETERIZATION  07/25/2017    100% SVG to RCA. 2.25x8 Promus to RCA. 95% GUEVARA to Ramus- 2.25x12 Promus    CARDIOVASCULAR STRESS TEST  06/28/2012    5 min 88% THR. Negative    CARDIOVASCULAR STRESS TEST  02/03/2014    L. Myoview- negative for ischemia    CARDIOVASCULAR STRESS TEST  03/24/2016    Mod Elvis-9 min, 84% THR, 142/98, no definite ischemia,  increase Metoprolol    CARDIOVASCULAR STRESS TEST  10/25/2016    EF 65%, no ischemia    CARDIOVASCULAR STRESS TEST  02/12/2018    L. Cardiolite- EF 65%. Small Inferoseptal Ischemia.    CARDIOVASCULAR STRESS TEST  03/10/2021    Lexiscan- EF > 70%. Negative    CARDIOVASCULAR STRESS TEST  11/09/2022    Lexiscan- EF > 70%. Small Inferolateral Ischemia    COLONOSCOPY  2021    CONVERTED (HISTORICAL) HOLTER  08/11/2015    baseline sinus 66, no arrhythmias, rare PVC, PAC    CORONARY ARTERY BYPASS GRAFT  09/10/2009    CROOK-LAD, GUEVARA-Ramus, seq SVG-Om1, OM2. SVG to OM3. SVG to PDA. PFO closure. TV repair    ECHO - CONVERTED  09/10/2009    ([facility]) Dr. Lucero EF 55%. Anteroseptal WMA    ECHO - CONVERTED  02/03/2014    EF 55-60%, mild MR, no pericardial effusion    ECHO - CONVERTED  03/24/2016    EF 55-60%, mild MR, RVSP 19 mmHg, tachycardia    ECHO - CONVERTED  10/25/2016    EF 55-60%, mild MR    ECHO - CONVERTED  02/12/2018    EF 65%. No shunt    ECHO - CONVERTED  03/10/2021    EF 65%. Trace-Mild MR    ECHO - CONVERTED  11/09/2022    TLS. EF 65%. LA- 3.7. Trace-Mild MR    ECHO - CONVERTED  10/17/2023    TLS. EF 65%. LA- 3.6. Trace-Mild MR. RVSP- 29 mmHg    ENDOSCOPY  2021    OTHER SURGICAL HISTORY  07/14/2015    MRI brain- no evidence of CVA or hemorrhage    US CAROTID UNILATERAL  07/22/2015    no sig stenosis       Current Outpatient Medications   Medication Sig Dispense Refill    aspirin 81 MG EC tablet Take 1 tablet by mouth once daily 90 tablet 2    atorvastatin (LIPITOR) 20 MG tablet Take 1 tablet by mouth Daily. 90 tablet 3    clopidogrel (PLAVIX) 75 MG tablet Take 1 tablet by mouth Daily. 90 tablet 2    levothyroxine (SYNTHROID, LEVOTHROID) 75 MCG tablet Take 1 tablet by mouth Daily.      metoclopramide (REGLAN) 10 MG tablet Take 1 tablet by mouth 2 (Two) Times a Day.      metoprolol succinate XL (TOPROL-XL) 25 MG 24 hr tablet Take 0.5 tablets by mouth 2 (Two) Times a Day. 90 tablet 2    nitroglycerin (NITROSTAT) 0.4 MG  "SL tablet Place 1 tablet under the tongue Every 5 (Five) Minutes As Needed for Chest Pain. Take no more than 3 doses in 15 minutes. 25 tablet 1    oxybutynin XL (DITROPAN-XL) 10 MG 24 hr tablet Take 1 tablet by mouth Daily.      pantoprazole (PROTONIX) 20 MG EC tablet Take 1 tablet by mouth Daily. 90 tablet 2     No current facility-administered medications for this visit.       Patient has no known allergies.    Past Medical History:   Diagnosis Date    Anxiety     CAD (coronary artery disease)     s/p cabg    Depression     GERD (gastroesophageal reflux disease)     H/O breast biopsy     benign    History of colonoscopy 2021    History of esophagogastroduodenoscopy (EGD) 2021    Hypercholesterolemia     Hyperlipidemia     Hypertension     Hypothyroidism     Insomnia        Social History     Socioeconomic History    Marital status: Single   Tobacco Use    Smoking status: Former     Current packs/day: 0.00     Average packs/day: 1.5 packs/day for 36.0 years (54.0 ttl pk-yrs)     Types: Cigarettes     Start date: 1973     Quit date: 2009     Years since quitting: 15.3    Smokeless tobacco: Never   Vaping Use    Vaping status: Never Used   Substance and Sexual Activity    Alcohol use: No    Drug use: No    Sexual activity: Defer       Family History   Problem Relation Age of Onset    Heart attack Father        Vitals:   /80 (BP Location: Left arm, Patient Position: Sitting)   Pulse 78   Ht 166.4 cm (65.51\")   Wt 81 kg (178 lb 9.6 oz)   BMI 29.26 kg/m²     Physical Exam  Vitals and nursing note reviewed.   Constitutional:       Appearance: She is obese.   Neck:      Vascular: No carotid bruit.   Cardiovascular:      Rate and Rhythm: Normal rate and regular rhythm.      Pulses: Normal pulses.      Heart sounds: Normal heart sounds. No murmur heard.     No friction rub. No gallop.   Pulmonary:      Effort: Pulmonary effort is normal.      Breath sounds: Normal breath sounds and air entry.   Musculoskeletal:    "   Right lower leg: No edema.      Left lower leg: No edema.   Skin:     General: Skin is warm and dry.      Capillary Refill: Capillary refill takes less than 2 seconds.   Neurological:      Mental Status: She is alert and oriented to person, place, and time.       Procedures     Assessment & Plan     Diagnoses and all orders for this visit:    1. History of tricuspid valve repair (Primary)    2. Hx of CABG  -     aspirin 81 MG EC tablet; Take 1 tablet by mouth once daily  Dispense: 90 tablet; Refill: 2  -     clopidogrel (PLAVIX) 75 MG tablet; Take 1 tablet by mouth Daily.  Dispense: 90 tablet; Refill: 2    3. Primary hypertension    4. Coronary artery disease involving native coronary artery of native heart without angina pectoris  -     aspirin 81 MG EC tablet; Take 1 tablet by mouth once daily  Dispense: 90 tablet; Refill: 2  -     metoprolol succinate XL (TOPROL-XL) 25 MG 24 hr tablet; Take 0.5 tablets by mouth 2 (Two) Times a Day.  Dispense: 90 tablet; Refill: 2  -     clopidogrel (PLAVIX) 75 MG tablet; Take 1 tablet by mouth Daily.  Dispense: 90 tablet; Refill: 2  -     nitroglycerin (NITROSTAT) 0.4 MG SL tablet; Place 1 tablet under the tongue Every 5 (Five) Minutes As Needed for Chest Pain. Take no more than 3 doses in 15 minutes.  Dispense: 25 tablet; Refill: 1    5. Mixed hyperlipidemia  -     atorvastatin (LIPITOR) 20 MG tablet; Take 1 tablet by mouth Daily.  Dispense: 90 tablet; Refill: 3    6. Essential hypertension  -     metoprolol succinate XL (TOPROL-XL) 25 MG 24 hr tablet; Take 0.5 tablets by mouth 2 (Two) Times a Day.  Dispense: 90 tablet; Refill: 2    7. Gastroesophageal reflux disease, unspecified whether esophagitis present  -     pantoprazole (PROTONIX) 20 MG EC tablet; Take 1 tablet by mouth Daily.  Dispense: 90 tablet; Refill: 2    History of tricuspid valve repair  - Echo was repeated 10/23, valve was stable    Hypertension  - BP controlled  - Continue current medication regimen including  Toprol-XL    CAD  - Continue aspirin, statin therapy, Plavix    Hyperlipidemia  Labs managed with PCP no recent lipid panel available for my review  - Continue current statin therapy; atorvastatin 20 mg    Stable cardiac wise. No further testing at this time. Refills sent to pharmacy. No medication changes     Visit Diagnoses:    ICD-10-CM ICD-9-CM   1. History of tricuspid valve repair  Z98.890 V15.1   2. Hx of CABG  Z95.1 V45.81   3. Primary hypertension  I10 401.9   4. Coronary artery disease involving native coronary artery of native heart without angina pectoris  I25.10 414.01   5. Mixed hyperlipidemia  E78.2 272.2   6. Essential hypertension  I10 401.9   7. Gastroesophageal reflux disease, unspecified whether esophagitis present  K21.9 530.81       Meds Ordered During Visit:  New Medications Ordered This Visit   Medications    aspirin 81 MG EC tablet     Sig: Take 1 tablet by mouth once daily     Dispense:  90 tablet     Refill:  2    metoprolol succinate XL (TOPROL-XL) 25 MG 24 hr tablet     Sig: Take 0.5 tablets by mouth 2 (Two) Times a Day.     Dispense:  90 tablet     Refill:  2    pantoprazole (PROTONIX) 20 MG EC tablet     Sig: Take 1 tablet by mouth Daily.     Dispense:  90 tablet     Refill:  2    clopidogrel (PLAVIX) 75 MG tablet     Sig: Take 1 tablet by mouth Daily.     Dispense:  90 tablet     Refill:  2    atorvastatin (LIPITOR) 20 MG tablet     Sig: Take 1 tablet by mouth Daily.     Dispense:  90 tablet     Refill:  3    nitroglycerin (NITROSTAT) 0.4 MG SL tablet     Sig: Place 1 tablet under the tongue Every 5 (Five) Minutes As Needed for Chest Pain. Take no more than 3 doses in 15 minutes.     Dispense:  25 tablet     Refill:  1       Follow Up Appointment:   Return in about 6 months (around 10/29/2024), or if symptoms worsen or fail to improve.           This document has been electronically signed by PRACHI Osorio  April 29, 2024 11:48 EDT    Dictated Utilizing Dragon Dictation: Part of  this note may be an electronic transcription/translation of spoken language to printed text using the Dragon Dictation System.

## 2024-11-04 ENCOUNTER — OFFICE VISIT (OUTPATIENT)
Dept: CARDIOLOGY | Facility: CLINIC | Age: 64
End: 2024-11-04
Payer: COMMERCIAL

## 2024-11-04 VITALS
DIASTOLIC BLOOD PRESSURE: 80 MMHG | HEART RATE: 96 BPM | SYSTOLIC BLOOD PRESSURE: 108 MMHG | WEIGHT: 179 LBS | BODY MASS INDEX: 28.77 KG/M2 | HEIGHT: 66 IN

## 2024-11-04 DIAGNOSIS — E55.9 VITAMIN D INSUFFICIENCY: ICD-10-CM

## 2024-11-04 DIAGNOSIS — R07.9 CHEST PAIN, UNSPECIFIED TYPE: ICD-10-CM

## 2024-11-04 DIAGNOSIS — R06.02 SHORTNESS OF BREATH: ICD-10-CM

## 2024-11-04 DIAGNOSIS — Z95.1 HX OF CABG: ICD-10-CM

## 2024-11-04 DIAGNOSIS — I25.10 CORONARY ARTERY DISEASE INVOLVING NATIVE CORONARY ARTERY OF NATIVE HEART WITHOUT ANGINA PECTORIS: ICD-10-CM

## 2024-11-04 DIAGNOSIS — E78.2 MIXED HYPERLIPIDEMIA: ICD-10-CM

## 2024-11-04 DIAGNOSIS — R00.2 PALPITATIONS: ICD-10-CM

## 2024-11-04 DIAGNOSIS — I10 PRIMARY HYPERTENSION: ICD-10-CM

## 2024-11-04 DIAGNOSIS — I10 ESSENTIAL HYPERTENSION: ICD-10-CM

## 2024-11-04 DIAGNOSIS — Z98.890 HISTORY OF TRICUSPID VALVE REPAIR: Primary | ICD-10-CM

## 2024-11-04 PROCEDURE — 99214 OFFICE O/P EST MOD 30 MIN: CPT | Performed by: NURSE PRACTITIONER

## 2024-11-04 PROCEDURE — 3079F DIAST BP 80-89 MM HG: CPT | Performed by: NURSE PRACTITIONER

## 2024-11-04 PROCEDURE — 3074F SYST BP LT 130 MM HG: CPT | Performed by: NURSE PRACTITIONER

## 2024-11-04 PROCEDURE — 93000 ELECTROCARDIOGRAM COMPLETE: CPT | Performed by: NURSE PRACTITIONER

## 2024-11-04 RX ORDER — CLOPIDOGREL BISULFATE 75 MG/1
75 TABLET ORAL DAILY
Qty: 90 TABLET | Refills: 3 | Status: SHIPPED | OUTPATIENT
Start: 2024-11-04

## 2024-11-04 RX ORDER — METOPROLOL SUCCINATE 25 MG/1
12.5 TABLET, EXTENDED RELEASE ORAL 2 TIMES DAILY
Qty: 90 TABLET | Refills: 3 | Status: SHIPPED | OUTPATIENT
Start: 2024-11-04

## 2024-11-04 RX ORDER — ATORVASTATIN CALCIUM 20 MG/1
20 TABLET, FILM COATED ORAL DAILY
Qty: 90 TABLET | Refills: 3 | Status: SHIPPED | OUTPATIENT
Start: 2024-11-04

## 2024-11-04 RX ORDER — ASPIRIN 81 MG/1
TABLET ORAL
Qty: 90 TABLET | Refills: 3 | Status: SHIPPED | OUTPATIENT
Start: 2024-11-04

## 2024-11-04 RX ORDER — NITROGLYCERIN 0.4 MG/1
0.4 TABLET SUBLINGUAL
Qty: 25 TABLET | Refills: 1 | Status: SHIPPED | OUTPATIENT
Start: 2024-11-04

## 2024-11-04 NOTE — PROGRESS NOTES
"Chief Complaint   Patient presents with    Follow-up     For cardiac management, has been having some SOB, progressively getting worse with any exertion. Started within the past few months. Edema in LE \"at times\".     Labs     Reports not seeing PCP or having labs in at least a year.     Med Refill     Refills needed on cardiac meds including nitro, hers has . [facility] days to [facility]    Medication Problem     Pt didn't bring meds or list. She went over our list and confirmed. She is going to go over list on AVS to confirm and call if different.         HPI:  HPI   Amina Nath is a 64 y.o. femalewith HTN, hypothyroidism, major depressive disorder, and IHD diagnosed in  when she was found to have severe three-vessel disease and also possible endocarditis involving the tricuspid valve. She underwent bypass surgery, tricuspid valve repair as well as PFO closure at that time. In  she established care here.  Cardiac cath in  revealed significant disease of SVG to RCA and GUEVARA to ramus and had stents placed in each graft in 2017.       On 2022 Lexiscan stress test showed small inferior lateral wall ischemia.  Recommendation was medical management if symptoms persist then elective cardiac catheterization. Repeat echocardiogram 10/17/2023 stable.     Today she returns to the office for follow-up visit.  She reports worsening in SOB with moderate exertion; walking any sort of distance. She has occasional dull chest pain and occasional palpitations where she feels her heart racing.     Cardiac History:    Past Surgical History:   Procedure Laterality Date    CARDIAC CATHETERIZATION  09/10/2009    Dr. HOLLIDAY 50% LM, 90% LAD, 80% LCX, 70% Ramus, 80% RCA.    CARDIAC CATHETERIZATION  2017    100% SVG to RCA. 2.25x8 Promus to RCA. 95% GUEVARA to Ramus- 2.25x12 Promus    CARDIOVASCULAR STRESS TEST  2012    5 min 88% THR. Negative    CARDIOVASCULAR STRESS TEST  2014    L. Myoview- " negative for ischemia    CARDIOVASCULAR STRESS TEST  03/24/2016    Mod Elvis-9 min, 84% THR, 142/98, no definite ischemia, increase Metoprolol    CARDIOVASCULAR STRESS TEST  10/25/2016    EF 65%, no ischemia    CARDIOVASCULAR STRESS TEST  02/12/2018    L. Cardiolite- EF 65%. Small Inferoseptal Ischemia.    CARDIOVASCULAR STRESS TEST  03/10/2021    Lexiscan- EF > 70%. Negative    CARDIOVASCULAR STRESS TEST  11/09/2022    Lexiscan- EF > 70%. Small Inferolateral Ischemia    COLONOSCOPY  2021    CONVERTED (HISTORICAL) HOLTER  08/11/2015    baseline sinus 66, no arrhythmias, rare PVC, PAC    CORONARY ARTERY BYPASS GRAFT  09/10/2009    CROOK-LAD, GUEVARA-Ramus, seq SVG-Om1, OM2. SVG to OM3. SVG to PDA. PFO closure. TV repair    ECHO - CONVERTED  09/10/2009    ([facility]) Dr. Lucero EF 55%. Anteroseptal WMA    ECHO - CONVERTED  02/03/2014    EF 55-60%, mild MR, no pericardial effusion    ECHO - CONVERTED  03/24/2016    EF 55-60%, mild MR, RVSP 19 mmHg, tachycardia    ECHO - CONVERTED  10/25/2016    EF 55-60%, mild MR    ECHO - CONVERTED  02/12/2018    EF 65%. No shunt    ECHO - CONVERTED  03/10/2021    EF 65%. Trace-Mild MR    ECHO - CONVERTED  11/09/2022    TLS. EF 65%. LA- 3.7. Trace-Mild MR    ECHO - CONVERTED  10/17/2023    TLS. EF 65%. LA- 3.6. Trace-Mild MR. RVSP- 29 mmHg    ENDOSCOPY  2021    OTHER SURGICAL HISTORY  07/14/2015    MRI brain- no evidence of CVA or hemorrhage    US CAROTID UNILATERAL  07/22/2015    no sig stenosis       Current Outpatient Medications   Medication Sig Dispense Refill    aspirin 81 MG EC tablet Take 1 tablet by mouth once daily 90 tablet 3    atorvastatin (LIPITOR) 20 MG tablet Take 1 tablet by mouth Daily. 90 tablet 3    clopidogrel (PLAVIX) 75 MG tablet Take 1 tablet by mouth Daily. 90 tablet 3    levothyroxine (SYNTHROID, LEVOTHROID) 75 MCG tablet Take 1 tablet by mouth Daily.      metoclopramide (REGLAN) 10 MG tablet Take 1 tablet by mouth 2 (Two) Times a Day.      metoprolol succinate XL  "(TOPROL-XL) 25 MG 24 hr tablet Take 0.5 tablets by mouth 2 (Two) Times a Day. 90 tablet 3    nitroglycerin (NITROSTAT) 0.4 MG SL tablet Place 1 tablet under the tongue Every 5 (Five) Minutes As Needed for Chest Pain. Take no more than 3 doses in 15 minutes. 25 tablet 1    oxybutynin XL (DITROPAN-XL) 10 MG 24 hr tablet Take 1 tablet by mouth Daily.      pantoprazole (PROTONIX) 20 MG EC tablet Take 1 tablet by mouth Daily. 90 tablet 2     No current facility-administered medications for this visit.       Patient has no known allergies.    Past Medical History:   Diagnosis Date    Anxiety     CAD (coronary artery disease)     s/p cabg    Depression     GERD (gastroesophageal reflux disease)     H/O breast biopsy     benign    History of colonoscopy 2021    History of esophagogastroduodenoscopy (EGD) 2021    Hypercholesterolemia     Hyperlipidemia     Hypertension     Hypothyroidism     Insomnia        Social History     Socioeconomic History    Marital status: Single   Tobacco Use    Smoking status: Former     Current packs/day: 0.00     Average packs/day: 1.5 packs/day for 36.0 years (54.0 ttl pk-yrs)     Types: Cigarettes     Start date: 1973     Quit date: 2009     Years since quitting: 15.8    Smokeless tobacco: Never   Vaping Use    Vaping status: Never Used   Substance and Sexual Activity    Alcohol use: No    Drug use: No    Sexual activity: Defer       Family History   Problem Relation Age of Onset    Heart attack Father        Vitals:   /80   Pulse 96   Ht 166.4 cm (65.5\")   Wt 81.2 kg (179 lb)   BMI 29.33 kg/m²     Physical Exam  Vitals and nursing note reviewed.   Constitutional:       Appearance: She is obese.   Neck:      Vascular: No carotid bruit.   Cardiovascular:      Rate and Rhythm: Normal rate and regular rhythm.      Pulses: Normal pulses.      Heart sounds: Normal heart sounds. No murmur heard.     No friction rub. No gallop.   Pulmonary:      Effort: Pulmonary effort is normal.      " Breath sounds: Normal breath sounds and air entry.   Musculoskeletal:      Right lower leg: No edema.      Left lower leg: No edema.   Skin:     General: Skin is warm and dry.      Capillary Refill: Capillary refill takes less than 2 seconds.   Neurological:      Mental Status: She is alert and oriented to person, place, and time.         ECG 12 Lead    Date/Time: 11/4/2024 10:04 AM  Performed by: Anna Campos APRN    Authorized by: Anna Campos APRN  Comparison: compared with previous ECG from 7/25/2015  Similar to previous ECG  Rhythm: sinus rhythm  Conduction: right bundle branch block    Clinical impression: non-specific ECG  Comments: Qtc 424 ms           Assessment & Plan     Diagnoses and all orders for this visit:    1. History of tricuspid valve repair (Primary)  -     Adult Transthoracic Echo Complete W/ Cont if Necessary Per Protocol; Future    2. Hx of CABG  -     Adult Transthoracic Echo Complete W/ Cont if Necessary Per Protocol; Future  -     Stress Test With Myocardial Perfusion One Day; Future  -     aspirin 81 MG EC tablet; Take 1 tablet by mouth once daily  Dispense: 90 tablet; Refill: 3  -     clopidogrel (PLAVIX) 75 MG tablet; Take 1 tablet by mouth Daily.  Dispense: 90 tablet; Refill: 3    3. Coronary artery disease involving native coronary artery of native heart without angina pectoris  -     Adult Transthoracic Echo Complete W/ Cont if Necessary Per Protocol; Future  -     Stress Test With Myocardial Perfusion One Day; Future  -     aspirin 81 MG EC tablet; Take 1 tablet by mouth once daily  Dispense: 90 tablet; Refill: 3  -     clopidogrel (PLAVIX) 75 MG tablet; Take 1 tablet by mouth Daily.  Dispense: 90 tablet; Refill: 3  -     metoprolol succinate XL (TOPROL-XL) 25 MG 24 hr tablet; Take 0.5 tablets by mouth 2 (Two) Times a Day.  Dispense: 90 tablet; Refill: 3  -     nitroglycerin (NITROSTAT) 0.4 MG SL tablet; Place 1 tablet under the tongue Every 5 (Five) Minutes As Needed for Chest  Pain. Take no more than 3 doses in 15 minutes.  Dispense: 25 tablet; Refill: 1  -     Comprehensive Metabolic Panel; Future    4. Primary hypertension    5. Mixed hyperlipidemia  -     atorvastatin (LIPITOR) 20 MG tablet; Take 1 tablet by mouth Daily.  Dispense: 90 tablet; Refill: 3  -     Lipid Panel; Future    6. Essential hypertension  -     metoprolol succinate XL (TOPROL-XL) 25 MG 24 hr tablet; Take 0.5 tablets by mouth 2 (Two) Times a Day.  Dispense: 90 tablet; Refill: 3  -     CBC & Differential; Future  -     Comprehensive Metabolic Panel; Future    7. Shortness of breath  -     Adult Transthoracic Echo Complete W/ Cont if Necessary Per Protocol; Future  -     Stress Test With Myocardial Perfusion One Day; Future  -     CBC & Differential; Future  -     Comprehensive Metabolic Panel; Future  -     ECG 12 Lead    8. Chest pain, unspecified type  -     Adult Transthoracic Echo Complete W/ Cont if Necessary Per Protocol; Future  -     Stress Test With Myocardial Perfusion One Day; Future  -     ECG 12 Lead    9. Palpitations  -     Comprehensive Metabolic Panel; Future  -     Magnesium; Future  -     TSH; Future  -     ECG 12 Lead    10. Vitamin D insufficiency  -     Vitamin D,25-Hydroxy; Future    History of tricuspid valve repair/history of CABG/CAD/shortness of breath/chest pain  - Recommend repeating stress test and echocardiogram to evaluate for ischemia, LVEF, valvular structure and function  - Continue DAPT and BP management  - Labs ordered as there are no recent labs    Hypertension  - BP controlled  - Continue current antihypertensive medication regimen    Hyperlipidemia  - Labs managed with PCP  - Continue current statin therapy    Palpitations  - Metabolic panel, magnesium, TSH ordered    EKG today shows sinus rhythm with right bundle branch block, rate of 96 bpm normal QTc and WI intervals    Visit Diagnoses:    ICD-10-CM ICD-9-CM   1. History of tricuspid valve repair  Z98.890 V15.1   2. Hx of  CABG  Z95.1 V45.81   3. Coronary artery disease involving native coronary artery of native heart without angina pectoris  I25.10 414.01   4. Primary hypertension  I10 401.9   5. Mixed hyperlipidemia  E78.2 272.2   6. Essential hypertension  I10 401.9   7. Shortness of breath  R06.02 786.05   8. Chest pain, unspecified type  R07.9 786.50   9. Palpitations  R00.2 785.1   10. Vitamin D insufficiency  E55.9 268.9       Meds Ordered During Visit:  New Medications Ordered This Visit   Medications    aspirin 81 MG EC tablet     Sig: Take 1 tablet by mouth once daily     Dispense:  90 tablet     Refill:  3    atorvastatin (LIPITOR) 20 MG tablet     Sig: Take 1 tablet by mouth Daily.     Dispense:  90 tablet     Refill:  3    clopidogrel (PLAVIX) 75 MG tablet     Sig: Take 1 tablet by mouth Daily.     Dispense:  90 tablet     Refill:  3    metoprolol succinate XL (TOPROL-XL) 25 MG 24 hr tablet     Sig: Take 0.5 tablets by mouth 2 (Two) Times a Day.     Dispense:  90 tablet     Refill:  3    nitroglycerin (NITROSTAT) 0.4 MG SL tablet     Sig: Place 1 tablet under the tongue Every 5 (Five) Minutes As Needed for Chest Pain. Take no more than 3 doses in 15 minutes.     Dispense:  25 tablet     Refill:  1       Follow Up Appointment:   Return in about 6 months (around 5/4/2025), or if symptoms worsen or fail to improve.           This document has been electronically signed by PRACHI Osorio  November 4, 2024 16:58 EST    Dictated Utilizing Dragon Dictation: Part of this note may be an electronic transcription/translation of spoken language to printed text using the Dragon Dictation System.

## 2025-02-22 DIAGNOSIS — K21.9 GASTROESOPHAGEAL REFLUX DISEASE, UNSPECIFIED WHETHER ESOPHAGITIS PRESENT: ICD-10-CM

## 2025-02-26 RX ORDER — PANTOPRAZOLE SODIUM 20 MG/1
20 TABLET, DELAYED RELEASE ORAL DAILY
Qty: 90 TABLET | Refills: 3 | Status: SHIPPED | OUTPATIENT
Start: 2025-02-26

## 2025-05-12 ENCOUNTER — OFFICE VISIT (OUTPATIENT)
Dept: CARDIOLOGY | Facility: CLINIC | Age: 65
End: 2025-05-12
Payer: COMMERCIAL

## 2025-05-12 VITALS
HEART RATE: 72 BPM | SYSTOLIC BLOOD PRESSURE: 110 MMHG | WEIGHT: 175 LBS | HEIGHT: 66 IN | DIASTOLIC BLOOD PRESSURE: 74 MMHG | BODY MASS INDEX: 28.12 KG/M2

## 2025-05-12 DIAGNOSIS — I10 PRIMARY HYPERTENSION: ICD-10-CM

## 2025-05-12 DIAGNOSIS — I25.10 CORONARY ARTERY DISEASE INVOLVING NATIVE CORONARY ARTERY OF NATIVE HEART WITHOUT ANGINA PECTORIS: ICD-10-CM

## 2025-05-12 DIAGNOSIS — K21.9 GASTROESOPHAGEAL REFLUX DISEASE, UNSPECIFIED WHETHER ESOPHAGITIS PRESENT: ICD-10-CM

## 2025-05-12 DIAGNOSIS — Z95.1 HX OF CABG: ICD-10-CM

## 2025-05-12 DIAGNOSIS — R06.02 SHORTNESS OF BREATH: Primary | ICD-10-CM

## 2025-05-12 DIAGNOSIS — E78.2 MIXED HYPERLIPIDEMIA: ICD-10-CM

## 2025-05-12 DIAGNOSIS — Z98.890 HISTORY OF TRICUSPID VALVE REPAIR: ICD-10-CM

## 2025-05-12 PROCEDURE — 99214 OFFICE O/P EST MOD 30 MIN: CPT | Performed by: NURSE PRACTITIONER

## 2025-05-12 PROCEDURE — 3074F SYST BP LT 130 MM HG: CPT | Performed by: NURSE PRACTITIONER

## 2025-05-12 PROCEDURE — 3078F DIAST BP <80 MM HG: CPT | Performed by: NURSE PRACTITIONER

## 2025-05-12 RX ORDER — NITROGLYCERIN 0.4 MG/1
0.4 TABLET SUBLINGUAL
Qty: 25 TABLET | Refills: 1 | Status: SHIPPED | OUTPATIENT
Start: 2025-05-12

## 2025-05-12 RX ORDER — PANTOPRAZOLE SODIUM 20 MG/1
20 TABLET, DELAYED RELEASE ORAL DAILY
Qty: 90 TABLET | Refills: 3 | Status: SHIPPED | OUTPATIENT
Start: 2025-05-12

## 2025-05-12 RX ORDER — METOPROLOL SUCCINATE 25 MG/1
12.5 TABLET, EXTENDED RELEASE ORAL 2 TIMES DAILY
Qty: 90 TABLET | Refills: 3 | Status: SHIPPED | OUTPATIENT
Start: 2025-05-12

## 2025-05-12 RX ORDER — ATORVASTATIN CALCIUM 20 MG/1
20 TABLET, FILM COATED ORAL DAILY
Qty: 90 TABLET | Refills: 3 | Status: SHIPPED | OUTPATIENT
Start: 2025-05-12

## 2025-05-12 RX ORDER — CLOPIDOGREL BISULFATE 75 MG/1
75 TABLET ORAL DAILY
Qty: 90 TABLET | Refills: 3 | Status: SHIPPED | OUTPATIENT
Start: 2025-05-12

## 2025-05-12 RX ORDER — ASPIRIN 81 MG/1
TABLET ORAL
Qty: 90 TABLET | Refills: 3 | Status: SHIPPED | OUTPATIENT
Start: 2025-05-12

## 2025-05-12 NOTE — PROGRESS NOTES
Chief Complaint   Patient presents with    Follow-up     Cardiac management    Lab     No current labs.    Shortness of Breath     Only notices with overexertion.    Med Refill     Needs refills on cardiac medications including Nitro sl-90 day        HPI:  HPI   Amina Nath is a 65 y.o. femalewith HTN, hypothyroidism, major depressive disorder, and IHD diagnosed in 2009 when she was found to have severe three-vessel disease and also possible endocarditis involving the tricuspid valve. She underwent bypass surgery, tricuspid valve repair as well as PFO closure at that time. In 2014 she established care here.  Cardiac cath in 2017 revealed significant disease of SVG to RCA and GUEVARA to ramus and had stents placed in each graft in July 2017.        On 11/9/2022 Lexiscan stress test showed small inferior lateral wall ischemia.  Recommendation was medical management if symptoms persist then elective cardiac catheterization. Repeat echocardiogram 10/17/2023 stable.    She returns today for a follow-up visit.  Stress test and echocardiogram and labs ordered 11/2024 and not completed 2/2 transportation issues. Patient denies chest pain, pressure, palpitations, tightness, dizziness. She reports SOB with moderate exertion.  She has not had labs recently.    Cardiac History:    Past Surgical History:   Procedure Laterality Date    CARDIAC CATHETERIZATION  09/10/2009    Dr. HOLLIDAY 50% LM, 90% LAD, 80% LCX, 70% Ramus, 80% RCA.    CARDIAC CATHETERIZATION  07/25/2017    100% SVG to RCA. 2.25x8 Promus to RCA. 95% GUEVARA to Ramus- 2.25x12 Promus    CARDIOVASCULAR STRESS TEST  06/28/2012    5 min 88% THR. Negative    CARDIOVASCULAR STRESS TEST  02/03/2014    L. Myoview- negative for ischemia    CARDIOVASCULAR STRESS TEST  03/24/2016    Mod Elvis-9 min, 84% THR, 142/98, no definite ischemia, increase Metoprolol    CARDIOVASCULAR STRESS TEST  10/25/2016    EF 65%, no ischemia    CARDIOVASCULAR STRESS TEST  02/12/2018    [place] Cardiolite- EF  65%. Small Inferoseptal Ischemia.    CARDIOVASCULAR STRESS TEST  03/10/2021    Lexiscan- EF > 70%. Negative    CARDIOVASCULAR STRESS TEST  11/09/2022    Lexiscan- EF > 70%. Small Inferolateral Ischemia    COLONOSCOPY  2021    CONVERTED (HISTORICAL) HOLTER  08/11/2015    baseline sinus 66, no arrhythmias, rare PVC, PAC    CORONARY ARTERY BYPASS GRAFT  09/10/2009    CROOK-LAD, GUEVARA-Ramus, seq SVG-Om1, OM2. SVG to OM3. SVG to PDA. PFO closure. TV repair    ECHO - CONVERTED  09/10/2009    ([facility]) Dr. Lucero EF 55%. Anteroseptal WMA    ECHO - CONVERTED  02/03/2014    EF 55-60%, mild MR, no pericardial effusion    ECHO - CONVERTED  03/24/2016    EF 55-60%, mild MR, RVSP 19 mmHg, tachycardia    ECHO - CONVERTED  10/25/2016    EF 55-60%, mild MR    ECHO - CONVERTED  02/12/2018    EF 65%. No shunt    ECHO - CONVERTED  03/10/2021    EF 65%. Trace-Mild MR    ECHO - CONVERTED  11/09/2022    TLS. EF 65%. LA- 3.7. Trace-Mild MR    ECHO - CONVERTED  10/17/2023    TLS. EF 65%. LA- 3.6. Trace-Mild MR. RVSP- 29 mmHg    ENDOSCOPY  2021    OTHER SURGICAL HISTORY  07/14/2015    MRI brain- no evidence of CVA or hemorrhage    US CAROTID UNILATERAL  07/22/2015    no sig stenosis       Current Outpatient Medications   Medication Sig Dispense Refill    aspirin 81 MG EC tablet Take 1 tablet by mouth once daily 90 tablet 3    atorvastatin (LIPITOR) 20 MG tablet Take 1 tablet by mouth Daily. 90 tablet 3    clopidogrel (PLAVIX) 75 MG tablet Take 1 tablet by mouth Daily. 90 tablet 3    levothyroxine (SYNTHROID, LEVOTHROID) 75 MCG tablet Take 1 tablet by mouth Daily.      metoclopramide (REGLAN) 10 MG tablet Take 1 tablet by mouth 2 (Two) Times a Day.      metoprolol succinate XL (TOPROL-XL) 25 MG 24 hr tablet Take 0.5 tablets by mouth 2 (Two) Times a Day. 90 tablet 3    nitroglycerin (NITROSTAT) 0.4 MG SL tablet Place 1 tablet under the tongue Every 5 (Five) Minutes As Needed for Chest Pain. Take no more than 3 doses in 15 minutes. 25 tablet 1     "oxybutynin XL (DITROPAN-XL) 10 MG 24 hr tablet Take 1 tablet by mouth Daily.      pantoprazole (PROTONIX) 20 MG EC tablet Take 1 tablet by mouth Daily. 90 tablet 3     No current facility-administered medications for this visit.       Patient has no known allergies.    Past Medical History:   Diagnosis Date    Anxiety     CAD (coronary artery disease)     s/p cabg    Depression     GERD (gastroesophageal reflux disease)     H/O breast biopsy     benign    History of colonoscopy     History of esophagogastroduodenoscopy (EGD)     Hypercholesterolemia     Hyperlipidemia     Hypertension     Hypothyroidism     Insomnia        Social History     Socioeconomic History    Marital status: Single   Tobacco Use    Smoking status: Former     Current packs/day: 0.00     Average packs/day: 1.5 packs/day for 36.0 years (54.0 ttl pk-yrs)     Types: Cigarettes     Start date:      Quit date:      Years since quittin.3     Passive exposure: Past    Smokeless tobacco: Never   Vaping Use    Vaping status: Never Used   Substance and Sexual Activity    Alcohol use: No    Drug use: No    Sexual activity: Defer       Family History   Problem Relation Age of Onset    Heart attack Father        Vitals:   /74 (BP Location: Left arm)   Pulse 72   Ht 166.4 cm (65.51\")   Wt 79.4 kg (175 lb)   BMI 28.67 kg/m²     Physical Exam  Vitals and nursing note reviewed.   Constitutional:       Appearance: She is overweight.   Neck:      Vascular: No carotid bruit.   Cardiovascular:      Rate and Rhythm: Normal rate and regular rhythm.      Pulses: Normal pulses.      Heart sounds: Normal heart sounds. No murmur heard.     No friction rub. No gallop.   Pulmonary:      Effort: Pulmonary effort is normal.      Breath sounds: Normal breath sounds and air entry.   Musculoskeletal:      Right lower leg: No edema.      Left lower leg: No edema.   Skin:     General: Skin is warm and dry.      Capillary Refill: Capillary refill " takes less than 2 seconds.   Neurological:      Mental Status: She is alert and oriented to person, place, and time.       Procedures     Assessment & Plan     Diagnoses and all orders for this visit:    1. Shortness of breath (Primary)  -     Adult Transthoracic Echo Complete W/ Cont if Necessary Per Protocol; Future  -     Stress Test With Myocardial Perfusion One Day; Future  -     CBC & Differential; Future  -     Comprehensive Metabolic Panel; Future  -     Magnesium; Future    2. Hx of CABG  -     Adult Transthoracic Echo Complete W/ Cont if Necessary Per Protocol; Future  -     Stress Test With Myocardial Perfusion One Day; Future  -     aspirin 81 MG EC tablet; Take 1 tablet by mouth once daily  Dispense: 90 tablet; Refill: 3  -     clopidogrel (PLAVIX) 75 MG tablet; Take 1 tablet by mouth Daily.  Dispense: 90 tablet; Refill: 3    3. History of tricuspid valve repair  -     Adult Transthoracic Echo Complete W/ Cont if Necessary Per Protocol; Future  -     Stress Test With Myocardial Perfusion One Day; Future    4. Coronary artery disease involving native coronary artery of native heart without angina pectoris  -     CBC & Differential; Future  -     Comprehensive Metabolic Panel; Future  -     Magnesium; Future  -     aspirin 81 MG EC tablet; Take 1 tablet by mouth once daily  Dispense: 90 tablet; Refill: 3  -     clopidogrel (PLAVIX) 75 MG tablet; Take 1 tablet by mouth Daily.  Dispense: 90 tablet; Refill: 3  -     metoprolol succinate XL (TOPROL-XL) 25 MG 24 hr tablet; Take 0.5 tablets by mouth 2 (Two) Times a Day.  Dispense: 90 tablet; Refill: 3  -     nitroglycerin (NITROSTAT) 0.4 MG SL tablet; Place 1 tablet under the tongue Every 5 (Five) Minutes As Needed for Chest Pain. Take no more than 3 doses in 15 minutes.  Dispense: 25 tablet; Refill: 1    5. Primary hypertension  -     CBC & Differential; Future  -     Comprehensive Metabolic Panel; Future  -     Magnesium; Future    6. Mixed hyperlipidemia  -      Lipid Panel; Future  -     atorvastatin (LIPITOR) 20 MG tablet; Take 1 tablet by mouth Daily.  Dispense: 90 tablet; Refill: 3  -     metoprolol succinate XL (TOPROL-XL) 25 MG 24 hr tablet; Take 0.5 tablets by mouth 2 (Two) Times a Day.  Dispense: 90 tablet; Refill: 3    7. Gastroesophageal reflux disease, unspecified whether esophagitis present  -     pantoprazole (PROTONIX) 20 MG EC tablet; Take 1 tablet by mouth Daily.  Dispense: 90 tablet; Refill: 3    SOB/Hx of CABG/Hx of TVR  - Recommend stress test and echocardiogram to evaluate for ischemia, EF, valvular function    HTN  - BP controlled  - Continue Toprol    Hyperlipidemia  - Ordered lipid panel  - Continue current statin therapy    Ordered stress test, echocardiogram, labs.  No medication changes made today.  Refills sent to pharmacy    Visit Diagnoses:    ICD-10-CM ICD-9-CM   1. Shortness of breath  R06.02 786.05   2. Hx of CABG  Z95.1 V45.81   3. History of tricuspid valve repair  Z98.890 V15.1   4. Coronary artery disease involving native coronary artery of native heart without angina pectoris  I25.10 414.01   5. Primary hypertension  I10 401.9   6. Mixed hyperlipidemia  E78.2 272.2   7. Gastroesophageal reflux disease, unspecified whether esophagitis present  K21.9 530.81       Meds Ordered During Visit:  New Medications Ordered This Visit   Medications    aspirin 81 MG EC tablet     Sig: Take 1 tablet by mouth once daily     Dispense:  90 tablet     Refill:  3    atorvastatin (LIPITOR) 20 MG tablet     Sig: Take 1 tablet by mouth Daily.     Dispense:  90 tablet     Refill:  3    clopidogrel (PLAVIX) 75 MG tablet     Sig: Take 1 tablet by mouth Daily.     Dispense:  90 tablet     Refill:  3    metoprolol succinate XL (TOPROL-XL) 25 MG 24 hr tablet     Sig: Take 0.5 tablets by mouth 2 (Two) Times a Day.     Dispense:  90 tablet     Refill:  3    pantoprazole (PROTONIX) 20 MG EC tablet     Sig: Take 1 tablet by mouth Daily.     Dispense:  90 tablet      Refill:  3    nitroglycerin (NITROSTAT) 0.4 MG SL tablet     Sig: Place 1 tablet under the tongue Every 5 (Five) Minutes As Needed for Chest Pain. Take no more than 3 doses in 15 minutes.     Dispense:  25 tablet     Refill:  1       Follow Up Appointment:   Return in about 6 months (around 11/12/2025), or if symptoms worsen or fail to improve.           This document has been electronically signed by PRACHI Osorio  May 12, 2025 09:18 EDT    Dictated Utilizing Dragon Dictation: Part of this note may be an electronic transcription/translation of spoken language to printed text using the Dragon Dictation System.